# Patient Record
Sex: FEMALE | Race: BLACK OR AFRICAN AMERICAN | Employment: UNEMPLOYED | URBAN - METROPOLITAN AREA
[De-identification: names, ages, dates, MRNs, and addresses within clinical notes are randomized per-mention and may not be internally consistent; named-entity substitution may affect disease eponyms.]

---

## 2020-12-24 ENCOUNTER — OFFICE VISIT (OUTPATIENT)
Dept: ORTHOPEDIC SURGERY | Age: 45
End: 2020-12-24
Payer: MEDICAID

## 2020-12-24 VITALS
WEIGHT: 246 LBS | OXYGEN SATURATION: 98 % | SYSTOLIC BLOOD PRESSURE: 147 MMHG | RESPIRATION RATE: 16 BRPM | DIASTOLIC BLOOD PRESSURE: 81 MMHG | TEMPERATURE: 97.1 F | HEART RATE: 72 BPM

## 2020-12-24 DIAGNOSIS — S83.511A RUPTURE OF ANTERIOR CRUCIATE LIGAMENT OF RIGHT KNEE, INITIAL ENCOUNTER: ICD-10-CM

## 2020-12-24 DIAGNOSIS — S83.241A TEAR OF MEDIAL MENISCUS OF RIGHT KNEE, CURRENT, UNSPECIFIED TEAR TYPE, INITIAL ENCOUNTER: Primary | ICD-10-CM

## 2020-12-24 DIAGNOSIS — M17.11 PRIMARY OSTEOARTHRITIS OF RIGHT KNEE: ICD-10-CM

## 2020-12-24 PROCEDURE — 20610 DRAIN/INJ JOINT/BURSA W/O US: CPT | Performed by: SPECIALIST

## 2020-12-24 PROCEDURE — 99203 OFFICE O/P NEW LOW 30 MIN: CPT | Performed by: SPECIALIST

## 2020-12-24 RX ORDER — AMLODIPINE BESYLATE 5 MG/1
5 TABLET ORAL DAILY
COMMUNITY
End: 2021-10-12 | Stop reason: DRUGHIGH

## 2020-12-24 RX ORDER — BETAMETHASONE SODIUM PHOSPHATE AND BETAMETHASONE ACETATE 3; 3 MG/ML; MG/ML
3 INJECTION, SUSPENSION INTRA-ARTICULAR; INTRALESIONAL; INTRAMUSCULAR; SOFT TISSUE ONCE
Status: COMPLETED | OUTPATIENT
Start: 2020-12-24 | End: 2020-12-24

## 2020-12-24 RX ORDER — CHLORTHALIDONE 25 MG/1
25 TABLET ORAL DAILY
COMMUNITY

## 2020-12-24 RX ADMIN — BETAMETHASONE SODIUM PHOSPHATE AND BETAMETHASONE ACETATE 3 MG: 3; 3 INJECTION, SUSPENSION INTRA-ARTICULAR; INTRALESIONAL; INTRAMUSCULAR; SOFT TISSUE at 09:39

## 2020-12-24 NOTE — PROGRESS NOTES
Patient: Joselito Gunn                MRN: [id number]       SSN: [id number]  YOB: 1975        AGE: 50 y.o. SEX: female    PCP: Staci, Not On File  12/24/20    Chief Complaint   Patient presents with    Knee Pain     right knee pain     HISTORY:  Joselito Gunn is a 50 y.o. female who is seen for right knee pain. She has been experiencing right knee pain for the past 1 year. She noted a sudden pain in January while mopping her kitchen floor. She twisted & felt a popping sensation in her knee when she made a sudden turn. She was able to continue mopping but felt increased swelling and pain the following day. She feels pain with standing, walking and stair climbing. She experiences startup pain after sitting. She is in pain management with Dr. Yareli Vega at [facility]. Pain Assessment  12/24/2020   Location of Pain Knee   Location Modifiers Right   Severity of Pain 6   Quality of Pain Throbbing   Frequency of Pain Intermittent   Aggravating Factors Walking;Standing;Stairs   Limiting Behavior No   Relieving Factors NSAID   Result of Injury No     Occupation, etc:  Ms. Alba Rincon is employed as a  at [employer]. She lives in [place] with her three children (one set of twins, ages 23 and 24). She does not have any grandchildren or any pets. Ms. Alba Rincon weighs 246 lbs. No results found for: HBA1C, HGBE8, TOI5KUIZ, STM4IBAM, IME8PFLX  Weight Metrics 12/24/2020   Weight 246 lb       There is no problem list on file for this patient.     REVIEW OF SYSTEMS:    Constitutional Symptoms: Negative   Eyes: Negative   Ears, Nose, Throat and Mouth: Negative   Cardiovascular: Negative   Respiratory: Negative   Genitourinary: Per HPI   Gastrointestinal: Per HPI   Integumentary (Skin and/or Breast): Negative   Musculoskeletal: Per HPI   Endocrine/Rheumatologic: Negative   Neurological: Per HPI   Hematology/Lymphatic: Negative    Allergic/Immunologic: Negative   Phychiatric: Negative    Social History     Socioeconomic History    Marital status: SINGLE     Spouse name: Not on file    Number of children: Not on file    Years of education: Not on file    Highest education level: Not on file   Occupational History    Not on file   Social Needs    Financial resource strain: Not on file    Food insecurity     Worry: Not on file     Inability: Not on file    Transportation needs     Medical: Not on file     Non-medical: Not on file   Tobacco Use    Smoking status: Not on file   Substance and Sexual Activity    Alcohol use: Not on file    Drug use: Not on file    Sexual activity: Not on file   Lifestyle    Physical activity     Days per week: Not on file     Minutes per session: Not on file    Stress: Not on file   Relationships    Social connections     Talks on phone: Not on file     Gets together: Not on file     Attends Voodoo service: Not on file     Active member of club or organization: Not on file     Attends meetings of clubs or organizations: Not on file     Relationship status: Not on file    Intimate partner violence     Fear of current or ex partner: Not on file     Emotionally abused: Not on file     Physically abused: Not on file     Forced sexual activity: Not on file   Other Topics Concern    Not on file   Social History Narrative    Not on file      No Known Allergies   Current Outpatient Medications   Medication Sig    amLODIPine (NORVASC) 5 mg tablet Take 5 mg by mouth daily.  chlorthalidone (HYGROTON) 25 mg tablet Take 25 mg by mouth daily.      Current Facility-Administered Medications   Medication Dose Route Frequency    betamethasone (CELESTONE) injection 3 mg  3 mg Intra artICUlar ONCE      PHYSICAL EXAMINATION:  Visit Vitals  BP (!) 147/81 (BP 1 Location: Left arm, BP Patient Position: Sitting)   Pulse 72   Temp 97.1 °F (36.2 °C) (Temporal)   Resp 16   Wt 246 lb (111.6 kg)   SpO2 98%      ORTHO EXAMINATION:  Examination Right knee Left knee Skin Intact Intact   Range of motion 100-5 120-0   Effusion - -   Medial joint line tenderness + -   Lateral joint line tenderness - -   Popliteal tenderness - -   Osteophytes palpable + -   Sherrys - -   Patella crepitus + +   Anterior drawer - -   Lateral laxity - -   Medial laxity - -   Varus deformity - -   Valgus deformity - -   Pretibial edema - -   Calf tenderness - -       TIME OUT:  Chart reviewed for the following:   I, Jazmine Bailey MD, have reviewed the History, Physical and updated the Allergic reactions for Evian D Gael   TIME OUT performed immediately prior to start of procedure:  Otilio Ventura MD, have performed the following reviews on Evian D Gael prior to the start of the procedure:          * Patient was identified by name and date of birth   * Agreement on procedure being performed was verified  * Risks and Benefits explained to the patient  * Procedure site verified and marked as necessary  * Patient was positioned for comfort  * Consent was obtained     Time: 9:22 AM     Date of procedure: 12/24/2020  Procedure performed by:  Jazmine Bailey MD  Ms. Liz Min tolerated the procedure well with no complications. MRI RIGHT KNEE 4/29/2020 [facility]  IMPRESSION:   1. Age-indeterminate low-grade partial ACL tear suspected near distal insertion site, likely with a chronic/remote component characterized by intraosseous ganglion cyst formation at the distal insertion site. 2. Medial meniscus posterior horn to body segment degeneration and/or tearing as described. Other major ligaments and lateral meniscus intact. 3. Focal full-thickness cartilage defect in medial trochlear groove. Grade 2-3 cartilage thinning in the medial compartment. RADIOGRAPHS:  XR RIGHT KNEE 12/15/2020 [facility]  IMPRESSION: No acute abnormality.  Degenerative arthropathy, greatest in the medial compartment.  -I have independently reviewed these images during this office visit. - Berna  IMPRESSION:  Three views with bilateral knees on AP view - No fractures, no effusion, moderate joint space narrowing, + osteophytes present. Kellgren Gautam grade 2      IMPRESSION:      ICD-10-CM ICD-9-CM    1. Tear of medial meniscus of right knee, current, unspecified tear type, initial encounter  S83.241A 836.0 REFERRAL TO PHYSICAL THERAPY   2. Primary osteoarthritis of right knee  M17.11 715.16 REFERRAL TO PHYSICAL THERAPY      PROCEDURE AUTHORIZATION TO       betamethasone (CELESTONE) injection 3 mg      FL DRAIN/INJECT LARGE JOINT/BURSA   3. Partial ACL tear  S83.511A 844.2 REFERRAL TO PHYSICAL THERAPY     PLAN: She will start a brief course of outpatient physical therapy. After discussing treatment options, patient's right knee was injected with 4 cc Marcaine and 1/2 cc Celestone. We discussed possible need for right knee arthroplasty at some time in the future if pain continues. She will follow up as needed.       Scribed by Annie Mondragon MD [address]) as dictated by Annie Mondragon MD

## 2021-10-12 ENCOUNTER — OFFICE VISIT (OUTPATIENT)
Dept: CARDIOLOGY CLINIC | Age: 46
End: 2021-10-12
Payer: MEDICAID

## 2021-10-12 VITALS
RESPIRATION RATE: 18 BRPM | WEIGHT: 248 LBS | DIASTOLIC BLOOD PRESSURE: 80 MMHG | OXYGEN SATURATION: 100 % | TEMPERATURE: 98.1 F | SYSTOLIC BLOOD PRESSURE: 141 MMHG | HEART RATE: 71 BPM

## 2021-10-12 DIAGNOSIS — R00.2 PALPITATIONS: Primary | ICD-10-CM

## 2021-10-12 PROCEDURE — 99204 OFFICE O/P NEW MOD 45 MIN: CPT | Performed by: INTERNAL MEDICINE

## 2021-10-12 RX ORDER — AMLODIPINE BESYLATE 10 MG/1
10 TABLET ORAL DAILY
Qty: 90 TABLET | Refills: 3 | Status: SHIPPED | OUTPATIENT
Start: 2021-10-12

## 2021-10-12 RX ORDER — ATORVASTATIN CALCIUM 40 MG/1
TABLET, FILM COATED ORAL
COMMUNITY
Start: 2021-08-26

## 2021-10-12 RX ORDER — CETIRIZINE HCL 10 MG
10 TABLET ORAL DAILY
COMMUNITY
Start: 2021-09-21

## 2021-10-12 RX ORDER — LOSARTAN POTASSIUM 50 MG/1
50 TABLET ORAL DAILY
COMMUNITY
End: 2022-02-15

## 2021-10-12 RX ORDER — GUAIFENESIN 100 MG/5ML
162 LIQUID (ML) ORAL DAILY
COMMUNITY
Start: 2021-07-24

## 2021-10-12 RX ORDER — DICLOFENAC SODIUM 10 MG/G
GEL TOPICAL 4 TIMES DAILY
COMMUNITY
End: 2021-11-03

## 2021-10-12 NOTE — PROGRESS NOTES
Ventura Horvath presents today for   Chief Complaint   Patient presents with    New Patient     nelson Horvath preferred language for health care discussion is english/other. Personal Protective Equipment:   Personal Protective Equipment was used including: mask-surgical and hands-gloves. Patient was placed on no precaution(s). Patient was masked. Precautions:   Patient currently on None  Patient currently roomed with door closed    Is someone accompanying this pt? no    Is the patient using any DME equipment during OV? no    Depression Screening:  3 most recent PHQ Screens 10/12/2021   PHQ Not Done Patient refuses   Little interest or pleasure in doing things -   Feeling down, depressed, irritable, or hopeless -   Total Score PHQ 2 -       Learning Assessment:  No flowsheet data found. Abuse Screening:  No flowsheet data found. Fall Risk  No flowsheet data found. Pt currently taking Anticoagulant therapy? no    Coordination of Care:  1. Have you been to the ER, urgent care clinic since your last visit? Hospitalized since your last visit? yes  2. Have you seen or consulted any other health care providers outside of the [address] [place] [address] since your last visit? Include any pap smears or colon screening.  no

## 2021-10-12 NOTE — PROGRESS NOTES
[facility]    Ms. Irene Mora is 40-year-old female with history of hypertension, hyperlipidemia, obesity and TIA. Patient is here today for cardiac evaluation. She denies any prior history of MI or CHF. She is not entirely sure the reason she is here. She tells me that she had a TIA recently. She denies any chest pain or chest tightness  She denies any significant shortness of breath. She is trying to workout regularly and eat healthy food  She takes her medication regularly without any side effects  She occasionally has some palpitation however lasting only for short period of time without any associated symptoms. She denies presyncope or syncope  Denies any nausea, vomiting, abdominal pain, fever, chills, sputum production. No hematuria or other bleeding complaints    Past Medical History:   Diagnosis Date    HLD (hyperlipidemia)     HTN (hypertension)     Obesity     TIA (transient ischemic attack)        Review of Systems:  Cardiac symptoms as noted above in HPI. All others negative. Current Outpatient Medications   Medication Sig    aspirin 81 mg chewable tablet Take 162 mg by mouth daily.  atorvastatin (LIPITOR) 40 mg tablet TAKE 1 TABLET BY MOUTH DAILY AT BEDTIME    chlorthalidone (HYGROTON) 25 mg tablet Take 25 mg by mouth daily.  cetirizine (ZYRTEC) 10 mg tablet Take 10 mg by mouth daily.  diclofenac (VOLTAREN) 1 % gel Apply  to affected area four (4) times daily. No current facility-administered medications for this visit. No past surgical history on file. Allergies and Sensitivities:  No Known Allergies    Family History:  No family history on file. Social History:  Social History     Tobacco Use    Smoking status: Unknown If Ever Smoked   Substance Use Topics    Alcohol use: Not Currently    Drug use: Never     She  has no history on file for tobacco use.   She  reports previous alcohol use.    Physical Exam:  BP Readings from Last 3 Encounters:   10/12/21 (!) 153/74   12/24/20 (!) 147/81         Pulse Readings from Last 3 Encounters:   10/12/21 75   12/24/20 72          Wt Readings from Last 3 Encounters:   10/12/21 112.5 kg (248 lb)   12/24/20 111.6 kg (246 lb)       Constitutional: Oriented to person, place, and time. HENT: Head: Normocephalic and atraumatic. Eyes: Conjunctivae and extraocular motions are normal.   Neck: No JVD present. Carotid bruit is not appreciated. Cardiovascular: Regular rhythm. No murmur, gallop or rubs appreciated  Lung: Breath sounds normal. No respiratory distress. No ronchi or rales appreciated  Abdominal: No tenderness. No rebound and no guarding. Musculoskeletal: There is no lower extremity edema. No cynosis  Lymphadenopathy:  No cervical or supraclavicular adenopathy appriciated. Neurological: No gross motor deficit noted. Skin: No visible skin rash noted. No Ear discharge noted  Psychiatric: Normal mood and affect. LABS:   @  Lab Results   Component Value Date/Time    WBC 10.7 01/29/2010 05:58 PM    HGB 9.7 (L) 01/29/2010 05:58 PM    HCT 31.8 (L) 01/29/2010 05:58 PM    PLATELET 507 31/38/6691 05:58 PM    MCV 70.7 (L) 01/29/2010 05:58 PM     Lab Results   Component Value Date/Time    Sodium 139 01/29/2010 05:58 PM    Potassium 3.8 01/29/2010 05:58 PM    Chloride 100 01/29/2010 05:58 PM    CO2 30 01/29/2010 05:58 PM    Glucose 82 01/29/2010 05:58 PM    BUN 7 01/29/2010 05:58 PM    Creatinine 0.9 01/29/2010 05:58 PM     No flowsheet data found.   No results found for: ALT  No results found for: HBA1C, PCN3PQBT, SIN3OINR, FTT6VBFQ  No results found for: TSH, TSH2, TSH3, TSHP, TSHEXT, TSHEXT    ECHO (09/2021)  * No evidence of shunt at atrial level by 2D, color flow and negative bubble study.     * Normal left ventricular cavity size with mild septal and moderate posterior hypertrophy.     * Left ventricular systolic function is hyperdynamic with mid to distal cavity obliteration and an ejection fraction of 80 % by visual estimation.     * Left ventricular diastolic function: normal.     * Normal right ventricular cavity size. Mildly reduced systolic function with preserved annular excursion.     * Right atrial chamber volume is mildly enlarged.     * Mild mitral and tricuspid regurgitation.     * No pulmonary hypertension, estimated pulmonary arterial systolic pressure is 16 mmHg. STRESS TEST (EST, PHARM, NUC, ECHO etc)    CATHETERIZATION    IMPRESSION & PLAN:  Ms. Jaki Esposito is a 49-year-old female    Hypertension:  /74. Will increase amlodipine to 10 mg daily. Continue other medication  BP check again in 2 weeks    Hyperlipidemia:  On atorvastatin. This is being checked by PCP regularly. Defer to PCP    TIA:  Had a TIA/strokelike symptoms in 07/2021. Occasional palpitation. Will place event monitor to rule out any arrhythmia or A. Fib  On aspirin, statin  Defer to PCP    Importance of diet and exercise was discussed with patient. This plan was discussed with patient who is in agreement. Thank you for allowing me to participate in patient care. Please feel free to call me if you have any question or concern. Coco Velasquez MD  Please note: This document has been produced using voice recognition software. Unrecognized errors in transcription may be present.

## 2021-10-12 NOTE — PATIENT INSTRUCTIONS
3 week BP check     New Medication/Medication Changes  Increase Norvasc to 10 mg daily   **please allow 24-48 hrs for medication to be escribed to pharmacy** If you need any refills on medications please contact your pharmacy so that the request can be escribed to the provider for review. Other Testing  [employer]( 30 day monitor) -will be calling you to confirm your address to send your Heart Monitor. Please allow 7-10 business days for monitor to arrive at your home.   Customer Service for [facility] [address]:  [phone]

## 2021-11-01 ENCOUNTER — TELEPHONE (OUTPATIENT)
Dept: CARDIOLOGY CLINIC | Age: 46
End: 2021-11-01

## 2021-11-01 ENCOUNTER — CLINICAL SUPPORT (OUTPATIENT)
Dept: CARDIOLOGY CLINIC | Age: 46
End: 2021-11-01

## 2021-11-01 VITALS — SYSTOLIC BLOOD PRESSURE: 146 MMHG | DIASTOLIC BLOOD PRESSURE: 75 MMHG | HEART RATE: 76 BPM

## 2021-11-01 DIAGNOSIS — Z01.30 BLOOD PRESSURE CHECK: Primary | ICD-10-CM

## 2021-11-01 NOTE — Clinical Note
Amlodipine, 10 mg, hygroton 25 mg and losartan 25 mg- please advise on any medication changes due to elevated BP check

## 2021-11-01 NOTE — TELEPHONE ENCOUNTER
Attempted to contact pt at  number, no answer. Lvm on secure line to increase losartan to 50 mg daily. Advised p to contact office at  [phone] to schedule 2 week bp check. Will continue to try to contact pt.

## 2021-11-01 NOTE — PROGRESS NOTES
Gina Horn was seen in our office today for BP evaluation. Listed below are the patients current meds:      Current Outpatient Medications:     aspirin 81 mg chewable tablet, Take 162 mg by mouth daily. , Disp: , Rfl:     atorvastatin (LIPITOR) 40 mg tablet, TAKE 1 TABLET BY MOUTH DAILY AT BEDTIME, Disp: , Rfl:     cetirizine (ZYRTEC) 10 mg tablet, Take 10 mg by mouth daily. , Disp: , Rfl:     diclofenac (VOLTAREN) 1 % gel, Apply  to affected area four (4) times daily. , Disp: , Rfl:     amLODIPine (NORVASC) 10 mg tablet, Take 1 Tablet by mouth daily. , Disp: 90 Tablet, Rfl: 3    losartan (COZAAR) 25 mg tablet, Take 25 mg by mouth daily. , Disp: , Rfl:     chlorthalidone (HYGROTON) 25 mg tablet, Take 25 mg by mouth daily. , Disp: , Rfl:       No current facility-administered medications for this visit. Visit Vitals  BP (!) 146/75   Pulse 76         Per pt she has been taking meds as rx. She stated she had not eating anything fatty or salty today and stated she drinks water. Pt denied any restlessness. Will forward to provider for review.

## 2021-11-02 NOTE — TELEPHONE ENCOUNTER
Contacted pt at [facility] number. Two patient Identifiers confirmed. Advised pt per Dr Nohemy Fay. Pt verbalized understanding and scheduled 2 weeks Bp check.

## 2021-11-03 ENCOUNTER — OFFICE VISIT (OUTPATIENT)
Dept: NEUROLOGY | Age: 46
End: 2021-11-03
Payer: MEDICAID

## 2021-11-03 VITALS
SYSTOLIC BLOOD PRESSURE: 130 MMHG | OXYGEN SATURATION: 96 % | DIASTOLIC BLOOD PRESSURE: 90 MMHG | HEART RATE: 81 BPM | WEIGHT: 243.8 LBS | RESPIRATION RATE: 16 BRPM

## 2021-11-03 DIAGNOSIS — R06.83 SNORING: ICD-10-CM

## 2021-11-03 DIAGNOSIS — I10 HYPERTENSION, UNSPECIFIED TYPE: ICD-10-CM

## 2021-11-03 DIAGNOSIS — R73.03 PREDIABETES: ICD-10-CM

## 2021-11-03 DIAGNOSIS — R41.3 MEMORY LOSS: ICD-10-CM

## 2021-11-03 DIAGNOSIS — G45.9 TIA (TRANSIENT ISCHEMIC ATTACK): Primary | ICD-10-CM

## 2021-11-03 DIAGNOSIS — R40.0 DAYTIME SLEEPINESS: ICD-10-CM

## 2021-11-03 PROCEDURE — 99204 OFFICE O/P NEW MOD 45 MIN: CPT | Performed by: NURSE PRACTITIONER

## 2021-11-03 RX ORDER — DICLOFENAC SODIUM 75 MG/1
TABLET, DELAYED RELEASE ORAL
COMMUNITY
Start: 2021-10-28

## 2021-11-03 NOTE — PROGRESS NOTES
S Resources  [address] [facility], [address], [place], [address]  [facility] Natanael Ozuna, [address]  Office:  [phone]  Fax: [phone]    Referring: Furman Schlatter, MD      Chief Complaint   Patient presents with    Follow-up    TIA       HPI:  Ching Coulter presents in new patient evaluation for TIA. She was admitted to [facility] in July 2021 for TIA. The records were reviewed in [facility]. She has a medical history of COPD and hypertension. Per records, it was noted that she initially woke up with the acute onset of left upper extremity weakness that lasted about an hour and spontaneously resolved. She denied associated numbness or tingling. It did not seem to be related to position per report. She also had complaint of chest pressure and pain associated with shortness of breath. She denied any significant increase in personal stressors that could cause anxiety. She was admitted for evaluation of TIA versus CVA and possible ACS. MRI of the brain on 7/23/2021 reported \"no acute brain abnormalities. Tiny chronic bilateral cerebellar strokes. No acute stroke. Few tiny cerebral white matter lesions some of which could be motion artifact. Nonspecific but likely a few chronic microvascular ischemic foci. Prominent adenoids. Nonspecific but the most common causes are chronic infections and allergies. \"  Carotid PVL reported normal extracranial carotid and vertebral duplex examination. TTE reported no evidence of shunt at atrial level by 2D, color flow and negative bubble study. Normal left ventricular cavity size with mild septal and moderate posterior hypertrophy. Left ventricular systolic function hyperdynamic and EF 80% by visual estimation. Hemoglobin A1c was 6.1. Lipid panel showed total cholesterol 135 and LDL-C 75. She had hypokalemia with potassium of 3.2 then 3.0.   She was discharged on aspirin 162 mg daily and atorvastatin 40 mg daily. She was continued on her home antihypertensive medication. It was noted that she had EKG with sinus rhythm and cardiac enzymes were negative. She worked with PT and OT and it was indicated that she did not need ongoing therapies. She did not need speech therapy. The discharge summary indicates that the chest pain may be anxiety related to waking up with left upper extremity weakness with concern for stroke. It was noted that chest x-ray showed vascular congestion and pulmonary edema and she uses albuterol as needed for COPD. She was discharged home at that time. She presents today and is accompanied by her son. She reports when she presented to the hospital, she had acute onset of LUE weakness the night before. She thought it was normal.  Reports a doctor told her she had arthritis in her shoulders. It was an orthopedic doctor. She reports the right shoulder is worse, the left acts up too but not as bad. Reports she presented with SOB and CP. Also the left arm was weak. She could not raise it up at all and had a weaker . No arm pain. She is right-handed. She never had stroke-like symptoms before. She denied associated speech change. She had visual change of seeing stars. Reports her BP was up. Endorses headache. She gets them at times. Denies numbness/tingling of the arm, it was just weak. She has history of hypertension and hyperlipidemia. Denies history of DM. She reports she was taking aspirin 81 mg before this. She had ran out before this occurred, about 3 weeks she believes. Her doctor at [facility] had ordered it. She saw Dr. Katia Ely cardiologist recently. Amlodipine was increased. She continues on atorvastatin which is 40 mg. She reported palpitations. She has an event monitor on currently. They do not occur every day, maybe once a week. She has concerns with her memory. She forgets little things. Misplaces items. Forgets appointments.   Goes into a room and forgets why she went there. She endorses sleeping at night on and off. Wakes up in the night. This is due to shoulder pain. Reports it is hard to get BP down when she is in pain. She is on oral diclofenac. BP is 130/90 in office today. Denies LUE weakness anymore but it gets 'tired' with use. Denies recurrence of weakness of LUE. Her memory has been affected for a while. Endorses loud snoring at times. Denies witnessed apneic spells. Endorses daytime fatigue. She wonders about taking turmeric. Endorses her thyroid was checked before covid. She had a normal TSH in 2017 found in [facility]. Denies mood disorders like depression or anxiety. Social history:  She works in [email or web address] at [employer].  Denies smoking, drug use, or alcohol use. She lives with her 1kids- 21year old and a set of 25year old twins. Family history: Mom had hypertension and renal disease. Does not know father's history. Brother has diabetes and hypertension. Aunt had diabetes. Grandmother had cardiac disease. Social History     Socioeconomic History    Marital status: SINGLE     Spouse name: Not on file    Number of children: Not on file    Years of education: Not on file    Highest education level: Not on file   Occupational History    Not on file   Tobacco Use    Smoking status: Unknown If Ever Smoked    Smokeless tobacco: Never Used   Substance and Sexual Activity    Alcohol use: Not Currently    Drug use: Never    Sexual activity: Not on file   Other Topics Concern    Not on file   Social History Narrative    Not on file     Social Determinants of Health     Financial Resource Strain:     Difficulty of Paying Living Expenses: Not on file   Food Insecurity:     Worried About Running Out of Food in the Last Year: Not on file    Dolly of Food in the Last Year: Not on file   Transportation Needs:     Lack of Transportation (Medical):  Not on file    Lack of Transportation (Non-Medical): Not on file   Physical Activity:     Days of Exercise per Week: Not on file    Minutes of Exercise per Session: Not on file   Stress:     Feeling of Stress : Not on file   Social Connections:     Frequency of Communication with Friends and Family: Not on file    Frequency of Social Gatherings with Friends and Family: Not on file    Attends Worship Services: Not on file    Active Member of [address] [place] [address] or Organizations: Not on file    Attends Club or Organization Meetings: Not on file    Marital Status: Not on file   Intimate Partner Violence:     Fear of Current or Ex-Partner: Not on file    Emotionally Abused: Not on file    Physically Abused: Not on file    Sexually Abused: Not on file   Housing Stability:     Unable to Pay for Housing in the Last Year: Not on file    Number of [place] in the Last Year: Not on file    Unstable Housing in the Last Year: Not on file       History reviewed. No pertinent family history. Current Outpatient Medications   Medication Sig Dispense Refill    aspirin 81 mg chewable tablet Take 162 mg by mouth daily.  atorvastatin (LIPITOR) 40 mg tablet TAKE 1 TABLET BY MOUTH DAILY AT BEDTIME      cetirizine (ZYRTEC) 10 mg tablet Take 10 mg by mouth daily.  amLODIPine (NORVASC) 10 mg tablet Take 1 Tablet by mouth daily. 90 Tablet 3    losartan (COZAAR) 50 mg tablet Take 50 mg by mouth daily.  chlorthalidone (HYGROTON) 25 mg tablet Take 25 mg by mouth daily.  diclofenac EC (VOLTAREN) 75 mg EC tablet TAKE 1 TABLET BY MOUTH TWICE DAILY WITH FOOD ON A SCHEDULED BASIS FOR 14 DAYS THEN AS NEEDED         Past Medical History:   Diagnosis Date    HLD (hyperlipidemia)     HTN (hypertension)     Obesity     TIA (transient ischemic attack)        No past surgical history on file. No Known Allergies    There is no problem list on file for this patient. Review of Systems:   Constitutional: no fever or chills. +fatigue.    Skin denies rash or itching  HEENT:  Denies tinnitus, hearing loss, or visual changes. +reports recent earache for 2 days (right side). Respiratory: denies shortness of breath  Cardiovascular: denies chest pain, dyspnea on exertion. +palpitations. Gastrointestinal: +nausea started this weekend. Denies vomiting. (denies chance of pregnancy). Genitourinary: does not report dysuria or incontinence  Musculoskeletal: +bilateral shoulder pain. +knee pain. Endocrine: denies weight change  Hematology:no easy bleeding. Endorses easy bruising. Neurological: as above in HPI      PHYSICAL EXAMINATION:      VITAL SIGNS:    Visit Vitals  BP (!) 130/90   Pulse 81   Resp 16   Wt 110.6 kg (243 lb 12.8 oz)   SpO2 96%       GENERAL: Well developed, well nourished, in no apparent distress. HEART: RR, no murmurs heard, no carotid bruits. LUNGS:                      CTAB. EXTREMITIES: No clubbing, cyanosis, or edema is identified. Pulses 2+    and symmetrical.  HEAD:   Normocephalic, atraumatic. NEUROLOGIC EXAMINATION    MENTAL STATUS: Awake, alert, and oriented x 4. Attention and STM are grossly normal. There is no aphasia. Fund of knowledge is adequate. Mood and affect are appropriate. Takes some time to respond- tired? CRANIAL NERVES: Visual fields are full to confrontation. Pupils are reactive to light and accommodation. Extraocular movements are intact and there is no nystagmus. Facial sensation is normal.  Face is symmetrical.   Hearing is grossly intact. SCM/TPZ 5/5. Palate rises symmetrically. Tongue is in the midline. MOTOR:  Normal tone, bulk, and strength, 5/5 muscle strength throughout. No cogwheel rigidity present. CEREBELLAR: Finger to nose was normal.   No tremors or dysmetria. SENSORY: Normal LT and PP. DTRs: +2 throughout. GAIT:   Gait is antalgic secondary to knee pain, a bit wide-based.           CBC:   Lab Results   Component Value Date/Time    WBC 10.7 01/29/2010 05:58 PM RBC 4.49 01/29/2010 05:58 PM    HGB 9.7 (L) 01/29/2010 05:58 PM    HCT 31.8 (L) 01/29/2010 05:58 PM    PLATELET 845 35/46/2068 05:58 PM     BMP:   Lab Results   Component Value Date/Time    Glucose 82 01/29/2010 05:58 PM    Sodium 139 01/29/2010 05:58 PM    Potassium 3.8 01/29/2010 05:58 PM    Chloride 100 01/29/2010 05:58 PM    CO2 30 01/29/2010 05:58 PM    BUN 7 01/29/2010 05:58 PM    Creatinine 0.9 01/29/2010 05:58 PM    Calcium 8.6 01/29/2010 05:58 PM     CMP:   Lab Results   Component Value Date/Time    Glucose 82 01/29/2010 05:58 PM    Sodium 139 01/29/2010 05:58 PM    Potassium 3.8 01/29/2010 05:58 PM    Chloride 100 01/29/2010 05:58 PM    CO2 30 01/29/2010 05:58 PM    BUN 7 01/29/2010 05:58 PM    Creatinine 0.9 01/29/2010 05:58 PM    Calcium 8.6 01/29/2010 05:58 PM    Anion gap 9 01/29/2010 05:58 PM    BUN/Creatinine ratio 8 (L) 01/29/2010 05:58 PM     Coagulation: No results found for: PTP, INR, APTT, PTTT, INREXT  Cardiac markers: No results found for: CPK, CKND1, ALEYDA  ABGs: No results found for: PH, PO2, PCO2, HCO3, BD, BE  Radiology review:     MRI BRAIN WO CONT 7/23/2021 in Care Everywhere:   Impression    1. No acute brain abnormalities. 2. Tiny chronic bilateral cerebellar strokes. No acute stroke. 3. Few tiny cerebral white matter lesions some of which could be motion artifact. Nonspecific but likely a few chronic microvascular ischemic foci. 4. Prominent adenoids. Nonspecific but the most common causes are chronic infections and allergies. Signed By: Mike Minor MD on 7/23/2021 4:57 PM    Narrative    EXAM: MR BRAIN WITHOUT CONTRAST     CLINICAL INDICATION/HISTORY: Focal Neuro deficit, new, fixed or worsening, stroke suspected. Headache and dizziness.      COMPARISON: CT 7/22/2021     TECHNIQUE: Multiplanar multi-sequential imaging of the whole brain without contrast. Sequences include sagittal and axial T1W, axial T2W, axial T2W FLAIR, DWI, a susceptibility weighted and coronal T1W gradient echo sequence. FINDINGS:     Brain: Some sequences are motion degraded. There are a few T2/FLAIR hyperintense cerebral white matter lesions some of which could be artifact. 3 tiny remote bilateral cerebellar strokes. No acute stroke. No mass effect or mass lesion. No microbleed. Brain volumes and ventricular spaces are normal for age. Vascular system: Grossly patent flow in the major vessels. Sella: Normal for non-dedicated exam.     Skull base: Normal.     Calvarium : Normal.     Paranasal sinuses and mastoid air cells: Single opacified right ethmoid air cell with no associated expansion. Visualized orbits: Normal for non-dedicated exam.     Visualized upper cervical spine: Mild degenerative change. Prominent adenoids. Impression/Plan: This is a 38-year-old female who presents in new patient evaluation after experiencing a TIA in July 2021. She presented at that time with transient left upper extremity weakness. MRI of the brain reported no acute stroke but she had report of tiny chronic bilateral cerebellar strokes. She has risk factors including hypertension and prediabetes. She also presented chest pain at that time and reports palpitations. She is currently seeing cardiology and wearing a cardiac event monitor. She is taking aspirin 162 mg and atorvastatin 40 mg daily. She has adhering to the medications. Discussed her personal vascular risk factors including prediabetes and to follow with her primary care provider and cardiologist for monitoring her risk factors including hypertension. Will obtain an MRA of the head and neck to evaluate for any areas of stenosis. She also reports concern about her memory. Reports not sleeping well and is up during the night due to shoulder pain. May consider orthopedic evaluation for her joint pains including shoulder pain and knee pain. Will obtain a TSH and vitamin B12 level.   Will obtain sleep evaluation due to her report of poor sleep, fatigue, and snoring. Will evaluate for potential sleep apnea as this can be a cardiovascular risk factor and associated with her memory concerns and headaches. Will also obtain neuropsych evaluation with Dr. Leila Green due to her memory concerns. Follow up after MRA and neuropsych evaluation, can call with MRA results sooner. Diagnoses and all orders for this visit:    1. TIA (transient ischemic attack)  -     MRA BRAIN WO CONT; Future  -     MRA NECK W WO CONT; Future    2. Hypertension, unspecified type    3. Prediabetes    4. Memory loss  -     TSH AND FREE T4; Future  -     VITAMIN B12 & FOLATE; Future  -     REFERRAL TO NEUROPSYCHOLOGY    5. Snoring  -     REFERRAL TO NEUROLOGY    6. Daytime sleepiness  -     REFERRAL TO NEUROLOGY        I spent 55 minutes with the patient in face-to-face consultation, with 30 minutes spent in counseling and coordination of care as described above. Signed By: Leighton Baldwin NP              PLEASE NOTE:   Portions of this document may have been produced using voice recognition software. Unrecognized errors in transcription may be present.

## 2021-11-03 NOTE — PROGRESS NOTES
Curtis Greer is a 50 y.o. female who is in the office as a follow up. 1. Have you been to the ER, urgent care clinic since your last visit? Hospitalized since your last visit? No    2. Have you seen or consulted any other health care providers outside of the [address] [place] [address] since your last visit? Include any pap smears or colon screening.  No

## 2021-11-03 NOTE — PATIENT INSTRUCTIONS

## 2021-11-04 DIAGNOSIS — G45.9 TIA (TRANSIENT ISCHEMIC ATTACK): ICD-10-CM

## 2021-11-24 ENCOUNTER — HOSPITAL ENCOUNTER (OUTPATIENT)
Dept: MRI IMAGING | Age: 46
Discharge: HOME | End: 2021-11-24
Attending: NURSE PRACTITIONER
Payer: MEDICAID

## 2021-11-24 PROCEDURE — A9576 INJ PROHANCE MULTIPACK: HCPCS | Performed by: NURSE PRACTITIONER

## 2021-11-24 PROCEDURE — 70544 MR ANGIOGRAPHY HEAD W/O DYE: CPT

## 2021-11-24 PROCEDURE — 74011250636 HC RX REV CODE- 250/636: Performed by: NURSE PRACTITIONER

## 2021-11-24 PROCEDURE — 70549 MR ANGIOGRAPH NECK W/O&W/DYE: CPT

## 2021-11-24 RX ADMIN — GADOTERIDOL 20 ML: 279.3 INJECTION, SOLUTION INTRAVENOUS at 18:24

## 2021-11-30 ENCOUNTER — TELEPHONE (OUTPATIENT)
Dept: NEUROLOGY | Age: 46
End: 2021-11-30

## 2021-11-30 NOTE — TELEPHONE ENCOUNTER
Called patient to give MRA results but it went to ; left message on voicemail to call back the office.  MRA head and neck were normal.

## 2021-12-21 DIAGNOSIS — R00.2 PALPITATIONS: ICD-10-CM

## 2022-02-15 ENCOUNTER — OFFICE VISIT (OUTPATIENT)
Dept: CARDIOLOGY CLINIC | Age: 47
End: 2022-02-15
Payer: MEDICAID

## 2022-02-15 VITALS
HEART RATE: 74 BPM | DIASTOLIC BLOOD PRESSURE: 75 MMHG | SYSTOLIC BLOOD PRESSURE: 146 MMHG | OXYGEN SATURATION: 98 % | WEIGHT: 244 LBS | RESPIRATION RATE: 16 BRPM | TEMPERATURE: 98.3 F

## 2022-02-15 DIAGNOSIS — R00.2 PALPITATIONS: ICD-10-CM

## 2022-02-15 DIAGNOSIS — E78.00 PURE HYPERCHOLESTEROLEMIA: ICD-10-CM

## 2022-02-15 DIAGNOSIS — I10 ESSENTIAL HYPERTENSION WITH GOAL BLOOD PRESSURE LESS THAN 140/90: Primary | ICD-10-CM

## 2022-02-15 PROCEDURE — 99214 OFFICE O/P EST MOD 30 MIN: CPT | Performed by: INTERNAL MEDICINE

## 2022-02-15 RX ORDER — LOSARTAN POTASSIUM 50 MG/1
75 TABLET ORAL DAILY
Qty: 60 TABLET | Refills: 3 | Status: SHIPPED | OUTPATIENT
Start: 2022-02-15

## 2022-02-15 NOTE — PROGRESS NOTES
Identified pt with two pt identifiers(name and ). Reviewed record in preparation for visit and have obtained necessary documentation. Celso Knapp presents today for   Chief Complaint   Patient presents with    Follow-up     4m               Celso Knapp preferred language for health care discussion is english/other. Personal Protective Equipment:   Personal Protective Equipment was used including: mask-surgical and hands-gloves. Patient was placed on no precaution(s). Patient was masked. Precautions:   Patient currently on None  Patient currently roomed with door closed. Is someone accompanying this pt? Is the patient using any DME equipment during OV? Depression Screening:  3 most recent PHQ Screens 2/15/2022   PHQ Not Done -   Little interest or pleasure in doing things Not at all   Feeling down, depressed, irritable, or hopeless Not at all   Total Score PHQ 2 0       Learning Assessment:  No flowsheet data found. Abuse Screening:  No flowsheet data found. Fall Risk  No flowsheet data found. Pt currently taking Anticoagulant therapy? no  Pt currently taking Antiplatelet therapy? no    Coordination of Care:  1. Have you been to the ER, urgent care clinic since your last visit? Hospitalized since your last visit? no    2. Have you seen or consulted any other health care providers outside of the [address] [place] [address] since your last visit? Include any pap smears or colon screening. no      Please see Red banners under Allergies and Med Rec to remove outside inquires. All correct information has been verified with patient and added to chart.      Medication's patient's would liked removed has been marked not taking to be removed per Verbal order and read back per Mynor Damian MD

## 2022-02-15 NOTE — PROGRESS NOTES
[facility]    Ms. Rimma Haile is 48 y.o. female with history of hypertension, hyperlipidemia, obesity and TIA. Patient is here today for cardiac evaluation. She denies any prior history of MI or CHF. She had her echocardiogram and event monitor done since last visit. She denies any chest pain or chest tightness. She has dyspnea on moderate to severe exertion, unchanged. She is telling me that she has some memory issues since she had a TIA. She admits of unhealthy dietary habits as well as sedentary lifestyle. She does not perform any regular exercise    Past Medical History:   Diagnosis Date    HLD (hyperlipidemia)     HTN (hypertension)     Obesity     TIA (transient ischemic attack)        Review of Systems:  Cardiac symptoms as noted above in HPI. All others negative. Current Outpatient Medications   Medication Sig    aspirin 81 mg chewable tablet Take 162 mg by mouth daily.  atorvastatin (LIPITOR) 40 mg tablet TAKE 1 TABLET BY MOUTH DAILY AT BEDTIME    amLODIPine (NORVASC) 10 mg tablet Take 1 Tablet by mouth daily.  losartan (COZAAR) 50 mg tablet Take 50 mg by mouth daily.  chlorthalidone (HYGROTON) 25 mg tablet Take 25 mg by mouth daily.  diclofenac EC (VOLTAREN) 75 mg EC tablet TAKE 1 TABLET BY MOUTH TWICE DAILY WITH FOOD ON A SCHEDULED BASIS FOR 14 DAYS THEN AS NEEDED    cetirizine (ZYRTEC) 10 mg tablet Take 10 mg by mouth daily. No current facility-administered medications for this visit. No past surgical history on file. Allergies and Sensitivities:  Allergies   Allergen Reactions    Beta-Blockers (Beta-Adrenergic Blocking Agts) Shortness of Breath    Iodinated Contrast Media Swelling    Losartan Cough       Family History:  No family history on file.     Social History:  Social History     Tobacco Use    Smoking status: Unknown If Ever Smoked    Smokeless tobacco: Never Used   Substance Use Topics    Alcohol use: Not Currently    Drug use: Never     She  has an unknown smoking status. She has never used smokeless tobacco.  She  reports previous alcohol use. Physical Exam:  BP Readings from Last 3 Encounters:   02/15/22 (!) 147/70   11/03/21 (!) 130/90   11/01/21 (!) 146/75         Pulse Readings from Last 3 Encounters:   02/15/22 79   11/03/21 81   11/01/21 76          Wt Readings from Last 3 Encounters:   02/15/22 110.7 kg (244 lb)   11/03/21 110.6 kg (243 lb 12.8 oz)   10/12/21 112.5 kg (248 lb)       Constitutional: Oriented to person, place, and time. HENT: Head: Normocephalic and atraumatic. Neck: No JVD present. Carotid bruit is not appreciated. Cardiovascular: Regular rhythm. No murmur, gallop or rubs appreciated  Lung: Breath sounds normal. No respiratory distress. No ronchi or rales appreciated  Abdominal: No tenderness. No rebound and no guarding. Musculoskeletal: There is no lower extremity edema. No cynosis    LABS:   @  Lab Results   Component Value Date/Time    WBC 10.7 01/29/2010 05:58 PM    HGB 9.7 (L) 01/29/2010 05:58 PM    HCT 31.8 (L) 01/29/2010 05:58 PM    PLATELET 286 77/90/3724 05:58 PM    MCV 70.7 (L) 01/29/2010 05:58 PM     Lab Results   Component Value Date/Time    Sodium 139 01/29/2010 05:58 PM    Potassium 3.8 01/29/2010 05:58 PM    Chloride 100 01/29/2010 05:58 PM    CO2 30 01/29/2010 05:58 PM    Glucose 82 01/29/2010 05:58 PM    BUN 7 01/29/2010 05:58 PM    Creatinine 0.9 01/29/2010 05:58 PM     No flowsheet data found.   No results found for: ALT  No results found for: HBA1C, WIP9THVO, RCR6TCWS, KPT6HIZY  No results found for: TSH, TSH2, TSH3, TSHP, TSHEXT, TSHEXT    ECHO (09/2021)  * No evidence of shunt at atrial level by 2D, color flow and negative bubble study.     * Normal left ventricular cavity size with mild septal and moderate posterior hypertrophy.     * Left ventricular systolic function is hyperdynamic with mid to distal cavity obliteration and an ejection fraction of 80 % by visual estimation.     * Left ventricular diastolic function: normal.     * Normal right ventricular cavity size. Mildly reduced systolic function with preserved annular excursion.     * Right atrial chamber volume is mildly enlarged.     * Mild mitral and tricuspid regurgitation.     * No pulmonary hypertension, estimated pulmonary arterial systolic pressure is 16 mmHg. STRESS TEST (EST, PHARM, NUC, ECHO etc)    IMPRESSION & PLAN:  Ms. Petar Torres is a 48 y.o. female    Hypertension:  /70. BP at home usually 140s. And will increase losartan from 50 mg to 75 mg daily. Continue amlodipine 10 and chlorthalidone. She will continue to check blood pressure at home. Goal blood pressure less than 088 systolic discussed with the patient    Hyperlipidemia:  On atorvastatin. This is being checked by PCP regularly. Defer to PCP    TIA:  Had a TIA/strokelike symptoms in 07/2021. Event monitor in 10/21, Sinus rhythm. No a.fib. PVC burden 1%, No sustained arrythmia. On aspirin, statin  Defer to PCP    Importance of diet and exercise was discussed with patient. This plan was discussed with patient who is in agreement. Thank you for allowing me to participate in patient care. Please feel free to call me if you have any question or concern. Madonna Collins MD  Please note: This document has been produced using voice recognition software. Unrecognized errors in transcription may be present.

## 2022-02-15 NOTE — PATIENT INSTRUCTIONS
New Medication/Medication Changes  Increase losartan to 75 mg daily     **please allow 24-48 hrs for medication to be escribed to pharmacy** If you need any refills on medications please contact your pharmacy so that the request can be escribed to the provider for review.

## 2022-02-15 NOTE — LETTER
2/15/2022    Patient: Jonas Lincoln   YOB: 1975   Date of Visit: 2/15/2022     Roger Marquez MD  [address] [place] [address] [address] [id number]  Via Fax: [phone]    Dear Roger Marquez MD,      Thank you for referring Ms. Azalia Mckeon to [facility] AT [facility] for evaluation. My notes for this consultation are attached. If you have questions, please do not hesitate to call me. I look forward to following your patient along with you.       Sincerely,    Anthony Corbin MD

## 2022-08-18 ENCOUNTER — OFFICE VISIT (OUTPATIENT)
Dept: CARDIOLOGY CLINIC | Age: 47
End: 2022-08-18
Payer: MEDICAID

## 2022-08-18 VITALS
SYSTOLIC BLOOD PRESSURE: 122 MMHG | TEMPERATURE: 97 F | DIASTOLIC BLOOD PRESSURE: 70 MMHG | OXYGEN SATURATION: 100 % | HEART RATE: 65 BPM | WEIGHT: 247.2 LBS

## 2022-08-18 DIAGNOSIS — R00.2 PALPITATIONS: Primary | ICD-10-CM

## 2022-08-18 PROCEDURE — 99214 OFFICE O/P EST MOD 30 MIN: CPT | Performed by: INTERNAL MEDICINE

## 2022-08-18 PROCEDURE — 93000 ELECTROCARDIOGRAM COMPLETE: CPT | Performed by: INTERNAL MEDICINE

## 2022-08-18 RX ORDER — POTASSIUM CHLORIDE 750 MG/1
1 TABLET, FILM COATED, EXTENDED RELEASE ORAL DAILY
COMMUNITY
Start: 2022-07-01

## 2022-08-18 NOTE — PROGRESS NOTES
Identified pt with two pt identifiers(name and ). Reviewed record in preparation for visit and have obtained necessary documentation. Talia Beaver presents today for   Chief Complaint   Patient presents with    Follow-up       Pt c/o marge Mayo preferred language for health care discussion is english/other. Personal Protective Equipment:   Personal Protective Equipment was used including: mask-surgical and hands-gloves. Patient was placed on no precaution(s). Patient was masked. Precautions:   Patient currently on None  Patient currently roomed with door closed. Is someone accompanying this pt? no    Is the patient using any DME equipment during [address]? no    Depression Screening:  3 most recent PHQ Screens 2022   PHQ Not Done -   Little interest or pleasure in doing things Not at all   Feeling down, depressed, irritable, or hopeless Not at all   Total Score PHQ 2 0       Learning Assessment:  No flowsheet data found. Abuse Screening:  No flowsheet data found. Fall Risk  No flowsheet data found. Pt currently taking Anticoagulant therapy? No   Pt currently taking Antiplatelet therapy? yes    Coordination of Care:  1. Have you been to the ER, urgent care clinic since your last visit? Hospital[facility] since your last visit? Yes[facility]    2. Have you seen or consulted any other health care providers outside of the [address] [place] [address] since your last visit? Include any pap smears or colon screening. yes      Please see Red banners under Allergies and Med Rec to remove outside inquires. All correct information has been verified with patient and added to chart.      Medication's patient's would liked removed has been marked not taking to be removed per Verbal order and read back per Lashell Sandoval MD

## 2022-08-18 NOTE — PROGRESS NOTES
[facility]    Ms. Chavez Buck is 48 y.o. female with history of hypertension, hyperlipidemia, obesity and TIA. Patient is here today for follow-up appointment for hypertension and hyperlipidemia. She denies any prior history of MI or CHF. She denies any chest pain or chest tightness. She is worried about her memory loss since her stroke. She has appointment with neurology team in couple months. She denies any significant lower extremity swelling. No presyncope or syncope. She has occasional vertigo. Past Medical History:   Diagnosis Date    HLD (hyperlipidemia)     HTN (hypertension)     Obesity     TIA (transient ischemic attack)        Review of Systems:  Cardiac symptoms as noted above in HPI. All others negative. Current Outpatient Medications   Medication Sig    potassium chloride SR (KLOR-CON 10) 10 mEq tablet Take 1 Tablet by mouth daily. losartan (COZAAR) 50 mg tablet Take 1.5 Tablets by mouth daily. aspirin 81 mg chewable tablet Take 162 mg by mouth daily. atorvastatin (LIPITOR) 40 mg tablet TAKE 1 TABLET BY MOUTH DAILY AT BEDTIME    amLODIPine (NORVASC) 10 mg tablet Take 1 Tablet by mouth daily. chlorthalidone (HYGROTON) 25 mg tablet Take 25 mg by mouth daily. diclofenac EC (VOLTAREN) 75 mg EC tablet TAKE 1 TABLET BY MOUTH TWICE DAILY WITH FOOD ON A SCHEDULED BASIS FOR 14 DAYS THEN AS NEEDED    cetirizine (ZYRTEC) 10 mg tablet Take 10 mg by mouth daily. No current facility-administered medications for this visit. No past surgical history on file. Allergies and Sensitivities:  Allergies   Allergen Reactions    Beta-Blockers (Beta-Adrenergic Blocking Agts) Shortness of Breath    Iodinated Contrast Media Swelling    Losartan Cough       Family History:  No family history on file.     Social History:  Social History     Tobacco Use    Smoking status: Unknown    Smokeless tobacco: Never   Substance Use Topics    Alcohol use: Not Currently    Drug use: Never     She  reports that she has an unknown smoking status. She has never used smokeless tobacco.  She  reports that she does not currently use alcohol. Physical Exam:  BP Readings from Last 3 Encounters:   08/18/22 122/70   02/15/22 (!) 146/75   11/03/21 (!) 130/90         Pulse Readings from Last 3 Encounters:   08/18/22 65   02/15/22 74   11/03/21 81          Wt Readings from Last 3 Encounters:   08/18/22 112.1 kg (247 lb 3.2 oz)   02/15/22 110.7 kg (244 lb)   11/03/21 110.6 kg (243 lb 12.8 oz)       Constitutional: Oriented to person, place, and time. HENT: Head: Normocephalic and atraumatic. Neck: No JVD present. Carotid bruit is not appreciated. Cardiovascular: Regular rhythm. No murmur, gallop or rubs appreciated  Lung: Breath sounds normal. No respiratory distress. No ronchi or rales appreciated  Abdominal: No tenderness. No rebound and no guarding. Musculoskeletal: There is no lower extremity edema. No cynosis    LABS:   @  Lab Results   Component Value Date/Time    WBC 10.7 01/29/2010 05:58 PM    HGB 9.7 (L) 01/29/2010 05:58 PM    HCT 31.8 (L) 01/29/2010 05:58 PM    PLATELET 547 18/00/4697 05:58 PM    MCV 70.7 (L) 01/29/2010 05:58 PM     Lab Results   Component Value Date/Time    Sodium 139 01/29/2010 05:58 PM    Potassium 3.8 01/29/2010 05:58 PM    Chloride 100 01/29/2010 05:58 PM    CO2 30 01/29/2010 05:58 PM    Glucose 82 01/29/2010 05:58 PM    BUN 7 01/29/2010 05:58 PM    Creatinine 0.9 01/29/2010 05:58 PM     No flowsheet data found. No results found for: ALT  No results found for: HBA1C, GYW9PSGO, WRD2PIFL, BTX2XLKU  No results found for: TSH, TSH2, TSH3, TSHP, TSHEXT, TSHEXT    ECHO (09/2021)  * No evidence of shunt at atrial level by 2D, color flow and negative bubble study. * Normal left ventricular cavity size with mild septal and moderate posterior hypertrophy.      * Left ventricular systolic function is hyperdynamic with mid to distal cavity obliteration and an ejection fraction of 80 % by visual estimation. * Left ventricular diastolic function: normal.     * Normal right ventricular cavity size. Mildly reduced systolic function with preserved annular excursion. * Right atrial chamber volume is mildly enlarged. * Mild mitral and tricuspid regurgitation. * No pulmonary hypertension, estimated pulmonary arterial systolic pressure is 16 mmHg. IMPRESSION & PLAN:  Ms. Torsten Alan is a 48 y.o. female    Hypertension: /70. Continue losartan, amlodipine, chlorthalidone. Hyperlipidemia: On atorvastatin. This is being checked by PCP regularly. Defer to PCP    TIA: Had a TIA/strokelike symptoms in 07/2021. Event monitor in 10/21, Sinus rhythm. No a.fib. PVC burden 1%, No sustained arrythmia. On aspirin, statin. She has follow appointment coming up with neurology team in 2 months    Currently patient denies any symptom that is concerning for angina or heart failure    Importance of diet and exercise was discussed with patient. This plan was discussed with patient who is in agreement. Thank you for allowing me to participate in patient care. Please feel free to call me if you have any question or concern. Fang Lott MD  Please note: This document has been produced using voice recognition software. Unrecognized errors in transcription may be present.

## 2024-03-28 RX ORDER — LOSARTAN POTASSIUM 50 MG/1
75 TABLET ORAL DAILY
Qty: 90 TABLET | Refills: 3 | Status: SHIPPED | OUTPATIENT
Start: 2024-03-28

## 2024-03-28 NOTE — TELEPHONE ENCOUNTER
PCP: Thi You MD    Last appt:  Visit date not found   Future Appointments   Date Time Provider Department Center   4/18/2024 11:00 AM Varghese Sun MD [facility]       Requested Prescriptions      No prescriptions requested or ordered in this encounter       Request for a 30 or 90 day supply? Provider Discretion    Pharmacy: Confirmed     Other Comments: N/A

## 2024-04-18 ENCOUNTER — OFFICE VISIT (OUTPATIENT)
Age: 49
End: 2024-04-18
Payer: MEDICAID

## 2024-04-18 VITALS
HEIGHT: 63 IN | DIASTOLIC BLOOD PRESSURE: 83 MMHG | HEART RATE: 64 BPM | BODY MASS INDEX: 43.52 KG/M2 | OXYGEN SATURATION: 98 % | WEIGHT: 245.6 LBS | SYSTOLIC BLOOD PRESSURE: 164 MMHG

## 2024-04-18 DIAGNOSIS — R00.2 PALPITATION: Primary | ICD-10-CM

## 2024-04-18 PROCEDURE — 99214 OFFICE O/P EST MOD 30 MIN: CPT | Performed by: INTERNAL MEDICINE

## 2024-04-18 PROCEDURE — 93000 ELECTROCARDIOGRAM COMPLETE: CPT | Performed by: INTERNAL MEDICINE

## 2024-04-18 RX ORDER — HYDROXYZINE HYDROCHLORIDE 25 MG/1
TABLET, FILM COATED ORAL
COMMUNITY
End: 2024-04-18

## 2024-04-18 RX ORDER — ALBUTEROL SULFATE 90 UG/1
AEROSOL, METERED RESPIRATORY (INHALATION)
COMMUNITY

## 2024-04-18 RX ORDER — LOSARTAN POTASSIUM 100 MG/1
100 TABLET ORAL DAILY
Qty: 90 TABLET | Refills: 3 | Status: SHIPPED | OUTPATIENT
Start: 2024-04-18

## 2024-04-18 ASSESSMENT — PATIENT HEALTH QUESTIONNAIRE - PHQ9
SUM OF ALL RESPONSES TO PHQ QUESTIONS 1-9: 0
2. FEELING DOWN, DEPRESSED OR HOPELESS: NOT AT ALL
SUM OF ALL RESPONSES TO PHQ QUESTIONS 1-9: 0
SUM OF ALL RESPONSES TO PHQ9 QUESTIONS 1 & 2: 0
1. LITTLE INTEREST OR PLEASURE IN DOING THINGS: NOT AT ALL

## 2024-05-20 ENCOUNTER — NURSE ONLY (OUTPATIENT)
Age: 49
End: 2024-05-20

## 2024-05-20 VITALS — SYSTOLIC BLOOD PRESSURE: 123 MMHG | DIASTOLIC BLOOD PRESSURE: 60 MMHG | HEART RATE: 64 BPM

## 2024-05-20 DIAGNOSIS — Z01.30 BLOOD PRESSURE CHECK: Primary | ICD-10-CM

## 2024-05-20 NOTE — PROGRESS NOTES
Thelma Vides was seen in our office today for BP evaluation. Listed below are the patients current meds:      Current Outpatient Medications:     albuterol sulfate HFA (PROVENTIL;VENTOLIN;PROAIR) 108 (90 Base) MCG/ACT inhaler, Albuterol HFA Inhaler 90 mcg/actuation    AS DIRECTED    active, Disp: , Rfl:     losartan (COZAAR) 100 MG tablet, Take 1 tablet by mouth daily, Disp: 90 tablet, Rfl: 3    amLODIPine (NORVASC) 10 MG tablet, Take 1 tablet by mouth daily, Disp: , Rfl:     aspirin 81 MG chewable tablet, Take 2 tablets by mouth daily, Disp: , Rfl:     atorvastatin (LIPITOR) 40 MG tablet, TAKE 1 TABLET BY MOUTH DAILY AT BEDTIME, Disp: , Rfl:     cetirizine (ZYRTEC) 10 MG tablet, Take 10 mg by mouth daily, Disp: , Rfl:     diclofenac (VOLTAREN) 75 MG EC tablet, TAKE 1 TABLET BY MOUTH TWICE DAILY WITH FOOD ON A SCHEDULED BASIS FOR 14 DAYS THEN AS NEEDED, Disp: , Rfl:     potassium chloride (KLOR-CON) 10 MEQ extended release tablet, Take 1 tablet by mouth daily, Disp: , Rfl:       No current facility-administered medications for this visit.    Vitals:    05/20/24 1045 05/20/24 1052   BP: (!) 142/72 123/60   Site: Left Upper Arm Left Upper Arm   Position: Sitting Sitting   Cuff Size: Large Adult Large Adult   Pulse: 71 64        Plan:     Patient to continue current medications. Advised patient that I would forward to Dr. Sun for review and further instructions. Advised patient that we would call within 24-48 hours with any changes. Patient verbalized understanding.

## 2024-08-06 ENCOUNTER — OFFICE VISIT (OUTPATIENT)
Facility: CLINIC | Age: 49
End: 2024-08-06
Payer: COMMERCIAL

## 2024-08-06 VITALS
OXYGEN SATURATION: 96 % | DIASTOLIC BLOOD PRESSURE: 68 MMHG | BODY MASS INDEX: 37.92 KG/M2 | HEIGHT: 63 IN | RESPIRATION RATE: 16 BRPM | WEIGHT: 214 LBS | HEART RATE: 66 BPM | TEMPERATURE: 97 F | SYSTOLIC BLOOD PRESSURE: 134 MMHG

## 2024-08-06 DIAGNOSIS — Z13.0 SCREENING FOR IRON DEFICIENCY ANEMIA: ICD-10-CM

## 2024-08-06 DIAGNOSIS — Z13.1 SCREENING FOR DIABETES MELLITUS (DM): ICD-10-CM

## 2024-08-06 DIAGNOSIS — Z11.59 ENCOUNTER FOR HEPATITIS C SCREENING TEST FOR LOW RISK PATIENT: ICD-10-CM

## 2024-08-06 DIAGNOSIS — Z00.00 ANNUAL PHYSICAL EXAM: Primary | ICD-10-CM

## 2024-08-06 DIAGNOSIS — Z13.29 SCREENING FOR THYROID DISORDER: ICD-10-CM

## 2024-08-06 DIAGNOSIS — Z76.89 ESTABLISHING CARE WITH NEW DOCTOR, ENCOUNTER FOR: ICD-10-CM

## 2024-08-06 DIAGNOSIS — J45.20 MILD INTERMITTENT ASTHMA WITHOUT COMPLICATION: ICD-10-CM

## 2024-08-06 DIAGNOSIS — E78.5 HYPERLIPIDEMIA, UNSPECIFIED HYPERLIPIDEMIA TYPE: ICD-10-CM

## 2024-08-06 DIAGNOSIS — Z11.4 SCREENING FOR HIV (HUMAN IMMUNODEFICIENCY VIRUS): ICD-10-CM

## 2024-08-06 DIAGNOSIS — R42 VERTIGO: ICD-10-CM

## 2024-08-06 DIAGNOSIS — Z12.11 SCREENING FOR COLON CANCER: ICD-10-CM

## 2024-08-06 DIAGNOSIS — I10 ESSENTIAL HYPERTENSION: ICD-10-CM

## 2024-08-06 PROCEDURE — 3078F DIAST BP <80 MM HG: CPT | Performed by: INTERNAL MEDICINE

## 2024-08-06 PROCEDURE — 99386 PREV VISIT NEW AGE 40-64: CPT | Performed by: INTERNAL MEDICINE

## 2024-08-06 PROCEDURE — 3075F SYST BP GE 130 - 139MM HG: CPT | Performed by: INTERNAL MEDICINE

## 2024-08-06 PROCEDURE — 99204 OFFICE O/P NEW MOD 45 MIN: CPT | Performed by: INTERNAL MEDICINE

## 2024-08-06 RX ORDER — MECLIZINE HCL 12.5 MG/1
12.5 TABLET ORAL DAILY PRN
Qty: 7 TABLET | Refills: 0 | Status: SHIPPED | OUTPATIENT
Start: 2024-08-06 | End: 2024-08-13

## 2024-08-06 RX ORDER — LOSARTAN POTASSIUM 100 MG/1
100 TABLET ORAL DAILY
Qty: 90 TABLET | Refills: 1 | Status: SHIPPED | OUTPATIENT
Start: 2024-08-06

## 2024-08-06 RX ORDER — ASPIRIN 81 MG/1
81 TABLET ORAL DAILY
COMMUNITY

## 2024-08-06 RX ORDER — ALBUTEROL SULFATE 90 UG/1
2 AEROSOL, METERED RESPIRATORY (INHALATION) EVERY 6 HOURS PRN
Qty: 18 G | Refills: 1 | Status: SHIPPED | OUTPATIENT
Start: 2024-08-06

## 2024-08-06 RX ORDER — DICLOFENAC SODIUM 75 MG/1
75 TABLET, DELAYED RELEASE ORAL 2 TIMES DAILY
COMMUNITY
End: 2024-08-06

## 2024-08-06 RX ORDER — AMLODIPINE BESYLATE 10 MG/1
10 TABLET ORAL DAILY
COMMUNITY
End: 2024-08-06 | Stop reason: SDUPTHER

## 2024-08-06 RX ORDER — POTASSIUM CHLORIDE 750 MG/1
10 TABLET, FILM COATED, EXTENDED RELEASE ORAL DAILY
COMMUNITY

## 2024-08-06 RX ORDER — AMLODIPINE BESYLATE 10 MG/1
10 TABLET ORAL DAILY
Qty: 90 TABLET | Refills: 1 | Status: SHIPPED | OUTPATIENT
Start: 2024-08-06

## 2024-08-06 RX ORDER — ATORVASTATIN CALCIUM 40 MG/1
40 TABLET, FILM COATED ORAL DAILY
Qty: 90 TABLET | Refills: 1 | Status: SHIPPED | OUTPATIENT
Start: 2024-08-06

## 2024-08-06 RX ORDER — LOSARTAN POTASSIUM 100 MG/1
100 TABLET ORAL DAILY
COMMUNITY
End: 2024-08-06 | Stop reason: SDUPTHER

## 2024-08-06 RX ORDER — ATORVASTATIN CALCIUM 40 MG/1
40 TABLET, FILM COATED ORAL DAILY
COMMUNITY
End: 2024-08-06 | Stop reason: SDUPTHER

## 2024-08-06 RX ORDER — CETIRIZINE HYDROCHLORIDE 10 MG/1
10 TABLET ORAL DAILY
COMMUNITY

## 2024-08-06 SDOH — ECONOMIC STABILITY: FOOD INSECURITY: WITHIN THE PAST 12 MONTHS, THE FOOD YOU BOUGHT JUST DIDN'T LAST AND YOU DIDN'T HAVE MONEY TO GET MORE.: OFTEN TRUE

## 2024-08-06 SDOH — ECONOMIC STABILITY: HOUSING INSECURITY
IN THE LAST 12 MONTHS, WAS THERE A TIME WHEN YOU DID NOT HAVE A STEADY PLACE TO SLEEP OR SLEPT IN A SHELTER (INCLUDING NOW)?: NO

## 2024-08-06 SDOH — ECONOMIC STABILITY: INCOME INSECURITY: HOW HARD IS IT FOR YOU TO PAY FOR THE VERY BASICS LIKE FOOD, HOUSING, MEDICAL CARE, AND HEATING?: NOT HARD AT ALL

## 2024-08-06 SDOH — ECONOMIC STABILITY: FOOD INSECURITY: WITHIN THE PAST 12 MONTHS, YOU WORRIED THAT YOUR FOOD WOULD RUN OUT BEFORE YOU GOT MONEY TO BUY MORE.: OFTEN TRUE

## 2024-08-06 ASSESSMENT — ENCOUNTER SYMPTOMS
SHORTNESS OF BREATH: 0
DIARRHEA: 0
CONSTIPATION: 0
ABDOMINAL PAIN: 0
COUGH: 0

## 2024-08-06 ASSESSMENT — PATIENT HEALTH QUESTIONNAIRE - PHQ9
SUM OF ALL RESPONSES TO PHQ QUESTIONS 1-9: 0
2. FEELING DOWN, DEPRESSED OR HOPELESS: NOT AT ALL
SUM OF ALL RESPONSES TO PHQ QUESTIONS 1-9: 0
1. LITTLE INTEREST OR PLEASURE IN DOING THINGS: NOT AT ALL
SUM OF ALL RESPONSES TO PHQ QUESTIONS 1-9: 0
SUM OF ALL RESPONSES TO PHQ QUESTIONS 1-9: 0
SUM OF ALL RESPONSES TO PHQ9 QUESTIONS 1 & 2: 0

## 2024-08-06 NOTE — PATIENT INSTRUCTIONS
[facility] Food Resources*  (Call United Way/211 if need more resources.)      [facility] and the [facility]  What they offer: Assistance with finding a food pantry, soup kitchen or resource near you.  Website: https://Novintline.org/get-help/community-resources/  Provide instructions for assistance with SNAP ([place]) application on this site.     SNAP ([place])  What they offer: SNAP is used like cash to buy eligible food items from authorized retailers.  Apply for benefits online: https://www.ZEturfp.[place].gov/  Apply for benefits by telephone: 282.798.3544  Apply for benefits at local Department of      Meals on Wheels   What they offer: Meals on Wheels is a program that delivers meals to individuals age 60+ at home who are unable to purchase or prepare their own meals.   Website: https://[employer].org/how-we-help/meals-on-wheels/  Requirements can vary by program and areas served.   To apply:  Contact [facility]: [phone] (Mon -Fri 8:30 a.m. to 4:30 p.m.)  Coverage Area:  [facility], [facility] & [facility]  Website: [email or web address]/contact-[email or web address]  Contact [place] [employer] [phone] (Mon - Fri 8:00am to 5:30pm)  Coverage Areas: [facility], [facility].    [facility]  What they offer:  [facility] provides comprehensive services to the disadvantaged/low-income community, homebound seniors and homeless in [place], [facility], and [facility].  Phone Number: [phone]  [address] [place] [address]  Services:  Food pantry (Monday, Wednesday, Friday), Soup kitchen, Senior Grocery Delivery Program, Food Bank, hygiene essentials, aid with completing SNAP applications.   Website:

## 2024-08-06 NOTE — PROGRESS NOTES
Subjective:      Patient ID: Thelma Vides is a 48 y.o. female.    Annual physical    Patient comes to establish PCP.  Patient denies fever chills sweats chest pain shortness of breath.  No GI symptoms, no abdominal pain, no blood in stools.  He had an abnormal finding on mammogram performed at [facility] according to the patient.  According to patient they found cysts and they needed to follow-up with but she has not had more information about it.  We requested records from mammogram today.  Patient also has an appointment follow-up with them.  She has experiencing some hot flashes off and on for the last couple of months.  History of hysterectomy 2015 from fibroids.  Next appointment will check cold flashes and hormonal workup.  Blood pressure is currently controlled, can continue current treatment.  Statins are tolerated can continue atorvastatin along with aspirin for hyperlipidemia and TIA.  Neurological exam unremarkable.  No gross or evident neurological deficit.  Apparently patient was taking low-dose potassium tablets daily, will check electrolytes and replace potassium as needed.  Asthma symptoms are controlled, asymptomatic, no wheezing or shortness of breath.  She usually uses albuterol inhaler when is triggered by exercises.  Declined vaccination.          Breast cyst 07/2024  Abnormal breast mammogram showing cysts according to jennifer, She sees spe[facility]ialsit in [facility].  We requested records from mammogram.  FU in [facility].    Hx of hysterectomy 2/2 fibroids and heavy bleeding and cramps (2015)    Mild intermittent asthma  albuterol sulfate inhaler, as needed.    Essential hypertension  losartan (COZAAR) 100 MG tablet, daily.  amLODIPine (NORVASC) 10 MG tablet, daily.    Hyperlipidemia  atorvastatin (LIPITOR) 40 MG tablet, daily.    Allergic rhinitis  cetirizine (ZYRTEC) 10 MG tablet, daily as needed.    TIA 2023  Neurological exam unremarkable.  No gross or evident neurological deficit.  aspirin 81 MG EC

## 2024-08-07 LAB
A/G RATIO: 1.5 RATIO (ref 1.1–2.6)
ALBUMIN: 4.1 G/DL (ref 3.5–5)
ALP BLD-CCNC: 67 U/L (ref 25–115)
ALT SERPL-CCNC: 25 U/L (ref 5–40)
ANION GAP SERPL CALCULATED.3IONS-SCNC: 10 MMOL/L (ref 3–15)
AST SERPL-CCNC: 22 U/L (ref 10–37)
BASOPHILS ABSOLUTE: 0 K/UL (ref 0–0.2)
BASOPHILS RELATIVE PERCENT: 0 % (ref 0–2)
BILIRUB SERPL-MCNC: 0.7 MG/DL (ref 0.2–1.2)
BUN BLDV-MCNC: 10 MG/DL (ref 6–22)
CALCIUM SERPL-MCNC: 9.1 MG/DL (ref 8.4–10.5)
CHLORIDE BLD-SCNC: 101 MMOL/L (ref 98–110)
CHOLESTEROL, TOTAL: 97 MG/DL (ref 110–200)
CHOLESTEROL/HDL RATIO: 2.2 (ref 0–5)
CO2: 33 MMOL/L (ref 20–32)
CREAT SERPL-MCNC: 0.5 MG/DL (ref 0.5–1.2)
EOSINOPHIL # BLD: 4 % (ref 0–6)
EOSINOPHILS ABSOLUTE: 0.3 K/UL (ref 0–0.5)
ESTIMATED AVERAGE GLUCOSE: 118 MG/DL (ref 91–123)
GFR, ESTIMATED: >60 ML/MIN/1.73 SQ.M.
GLOBULIN: 2.8 G/DL (ref 2–4)
GLUCOSE: 90 MG/DL (ref 70–99)
HBA1C MFR BLD: 5.7 % (ref 4.8–5.6)
HCT VFR BLD CALC: 45.3 % (ref 35.1–48)
HDLC SERPL-MCNC: 45 MG/DL
HEMOGLOBIN: 13.7 G/DL (ref 11.7–16)
HEPATITIS C ANTIBODY: NORMAL
HIV -1/0/2 AG/AB WITH REFLEX: NON REACTIVE
HIV INTERPRETATION: NORMAL
LDL CHOLESTEROL: 39 MG/DL (ref 50–99)
LDL/HDL RATIO: 0.9
LYMPHOCYTES # BLD: 21 % (ref 20–45)
LYMPHOCYTES ABSOLUTE: 1.6 K/UL (ref 1–4.8)
MCH RBC QN AUTO: 27 PG (ref 26–34)
MCHC RBC AUTO-ENTMCNC: 30 G/DL (ref 31–36)
MCV RBC AUTO: 90 FL (ref 80–99)
MONOCYTES ABSOLUTE: 0.5 K/UL (ref 0.1–1)
MONOCYTES: 7 % (ref 3–12)
NEUTROPHILS ABSOLUTE: 5.1 K/UL (ref 1.8–7.7)
NEUTROPHILS: 67 % (ref 40–75)
NON-HDL CHOLESTEROL: 52 MG/DL
PDW BLD-RTO: 16.7 % (ref 10–15.5)
PLATELET # BLD: 360 K/UL (ref 140–440)
PMV BLD AUTO: 9.4 FL (ref 9–13)
POTASSIUM SERPL-SCNC: 3.5 MMOL/L (ref 3.5–5.5)
RBC # BLD: 5.03 M/UL (ref 3.8–5.2)
SODIUM BLD-SCNC: 144 MMOL/L (ref 133–145)
T4 FREE: 1.2 NG/DL (ref 0.9–1.8)
TOTAL PROTEIN: 6.9 G/DL (ref 6.4–8.3)
TRIGL SERPL-MCNC: 62 MG/DL (ref 40–149)
TSH SERPL DL<=0.05 MIU/L-ACNC: 0.66 MCU/ML (ref 0.27–4.2)
VLDLC SERPL CALC-MCNC: 12 MG/DL (ref 8–30)
WBC # BLD: 7.6 K/UL (ref 4–11)

## 2024-08-19 ENCOUNTER — CLINICAL DOCUMENTATION (OUTPATIENT)
Facility: CLINIC | Age: 49
End: 2024-08-19

## 2024-08-19 RX ORDER — POTASSIUM CHLORIDE 750 MG/1
TABLET, FILM COATED, EXTENDED RELEASE ORAL
Qty: 20 TABLET | Refills: 0 | Status: SHIPPED | OUTPATIENT
Start: 2024-08-19

## 2024-08-19 NOTE — PROGRESS NOTES
Received request to refill Potassium tablets, according to last visit, she was not taking potassium tablet. I called patient multiple time without answer. I asked staff to call patient for clarification. In the meanwhile a did a short refill.

## 2024-08-19 NOTE — TELEPHONE ENCOUNTER
Last Appointment:  8/6/2024  Future Appointments   Date Time Provider Department Center   9/10/2024  9:30 AM Amrita Hughes MD [facility] DEP   9/17/2024 11:30 AM Dung Ramirez MD [facility] DEP   10/24/2024 11:30 AM Kate Loja PA-C [facility] AMB

## 2024-10-03 ENCOUNTER — HOSPITAL ENCOUNTER (OUTPATIENT)
Facility: HOSPITAL | Age: 49
Setting detail: SPECIMEN
Discharge: HOME | End: 2024-10-06

## 2024-10-03 ENCOUNTER — OFFICE VISIT (OUTPATIENT)
Facility: CLINIC | Age: 49
End: 2024-10-03
Payer: COMMERCIAL

## 2024-10-03 VITALS
HEART RATE: 68 BPM | BODY MASS INDEX: 43.94 KG/M2 | HEIGHT: 63 IN | TEMPERATURE: 97 F | WEIGHT: 248 LBS | SYSTOLIC BLOOD PRESSURE: 136 MMHG | OXYGEN SATURATION: 94 % | RESPIRATION RATE: 16 BRPM | DIASTOLIC BLOOD PRESSURE: 74 MMHG

## 2024-10-03 DIAGNOSIS — R23.2 HOT FLASHES: Primary | ICD-10-CM

## 2024-10-03 DIAGNOSIS — E87.6 HYPOKALEMIA: ICD-10-CM

## 2024-10-03 LAB — SENTARA SPECIMEN COLLECTION: NORMAL

## 2024-10-03 PROCEDURE — 99001 SPECIMEN HANDLING PT-LAB: CPT

## 2024-10-03 PROCEDURE — 99214 OFFICE O/P EST MOD 30 MIN: CPT | Performed by: INTERNAL MEDICINE

## 2024-10-03 RX ORDER — POTASSIUM CHLORIDE 750 MG/1
10 TABLET, EXTENDED RELEASE ORAL DAILY
Qty: 90 TABLET | Refills: 1 | Status: SHIPPED | OUTPATIENT
Start: 2024-10-03

## 2024-10-03 RX ORDER — GABAPENTIN 100 MG/1
CAPSULE ORAL
Qty: 90 CAPSULE | Refills: 0 | Status: SHIPPED | OUTPATIENT
Start: 2024-10-03 | End: 2024-11-01

## 2024-10-03 ASSESSMENT — ENCOUNTER SYMPTOMS: SHORTNESS OF BREATH: 0

## 2024-10-03 NOTE — PROGRESS NOTES
Subjective:      Patient ID: Thelma Vides is a 48 y.o. female.    Follow-up    Patient is coming today to discuss her hot flashes.  She admits feeling seen cold flashes for about a year now.  Mostly present in the evening and nighttime.  He does not let her sleep.  She uses fans and open the windows without much help.  She complains of fatigue, occasional sweating.  She has a history of hysterectomy with bilateral salpingectomy, no records about oophorectomy.  Will get hormonal testing.  Started gabapentin at nighttime.  Patient was instructed not to drive or do activities that can put her or others at risk while taking this medication since it can cause drowsiness and sleepiness in order to avoid accidents.  Patient understood and agreed with the plan.  She is taking potassium tablets daily for a few years now.  Electrolytes within normal limits on potassium tablet daily.  Follow-up in 4 weeks to monitor electrolytes and hot flashes response to treatment.          Breast cyst 07/2024  Abnormal breast mammogram showing cysts according to jennifer, She sees [facility] in [facility].  We requested records from mammogram.  FU in [facility].    Hx of hysterectomy 2/2 fibroids and heavy bleeding and cramps (Hysterectomy 8/13/2018)  Operative Procedure: Total laparoscopic hysterectomy with bilateral salpingectomy and colpopexy, abdominal approach, cystoscopy    Mild intermittent asthma  albuterol sulfate inhaler, as needed.    Essential hypertension  losartan (COZAAR) 100 MG tablet, daily.  amLODIPine (NORVASC) 10 MG tablet, daily.    Hyperlipidemia/TIA  atorvastatin (LIPITOR) 40 MG tablet, daily.    Allergic rhinitis  cetirizine (ZYRTEC) 10 MG tablet, daily as needed.    TIA 2023  Neurological exam unremarkable.  No gross or evident neurological deficit.  aspirin 81 MG EC tablet, daily. OTC.  atorvastatin (LIPITOR) 40 MG tablet, daily.    Occasional vertigo  Once a month usually when bending forward.  Uses meclizine as needed

## 2024-10-04 LAB
FOLLICLE STIMULATING HORMONE: 7.5 MIU/ML
LUTEINIZING HORMONE: 15.4 MIU/ML
PROLACTIN: 14.9 NG/ML (ref 4.8–23.3)

## 2025-01-07 ENCOUNTER — COMMUNITY OUTREACH (OUTPATIENT)
Facility: CLINIC | Age: 50
End: 2025-01-07

## 2025-01-07 NOTE — PROGRESS NOTES
Patient's HM shows they are overdue for Colorectal Screening.   Care Everywhere and  files searched.   updated with 10/21/24 Colonoscopy.

## 2025-01-21 ENCOUNTER — HOSPITAL ENCOUNTER (OUTPATIENT)
Facility: HOSPITAL | Age: 50
Setting detail: SPECIMEN
Discharge: HOME | End: 2025-01-24

## 2025-01-21 ENCOUNTER — OFFICE VISIT (OUTPATIENT)
Facility: CLINIC | Age: 50
End: 2025-01-21
Payer: COMMERCIAL

## 2025-01-21 VITALS
HEIGHT: 62 IN | HEART RATE: 67 BPM | RESPIRATION RATE: 17 BRPM | OXYGEN SATURATION: 97 % | BODY MASS INDEX: 45.86 KG/M2 | SYSTOLIC BLOOD PRESSURE: 135 MMHG | WEIGHT: 249.2 LBS | DIASTOLIC BLOOD PRESSURE: 71 MMHG | TEMPERATURE: 97 F

## 2025-01-21 DIAGNOSIS — R73.03 PRE-DIABETES: ICD-10-CM

## 2025-01-21 DIAGNOSIS — E78.5 HYPERLIPIDEMIA, UNSPECIFIED HYPERLIPIDEMIA TYPE: ICD-10-CM

## 2025-01-21 DIAGNOSIS — G89.29 CHRONIC PAIN OF BOTH KNEES: Primary | ICD-10-CM

## 2025-01-21 DIAGNOSIS — M25.561 CHRONIC PAIN OF BOTH KNEES: Primary | ICD-10-CM

## 2025-01-21 DIAGNOSIS — Z86.39 HISTORY OF LOW POTASSIUM: ICD-10-CM

## 2025-01-21 DIAGNOSIS — M25.562 CHRONIC PAIN OF BOTH KNEES: Primary | ICD-10-CM

## 2025-01-21 DIAGNOSIS — E87.6 HYPOKALEMIA: ICD-10-CM

## 2025-01-21 DIAGNOSIS — I10 ESSENTIAL HYPERTENSION: ICD-10-CM

## 2025-01-21 DIAGNOSIS — R23.2 HOT FLASHES: ICD-10-CM

## 2025-01-21 LAB — SENTARA SPECIMEN COLLECTION: NORMAL

## 2025-01-21 PROCEDURE — 99001 SPECIMEN HANDLING PT-LAB: CPT

## 2025-01-21 PROCEDURE — 3078F DIAST BP <80 MM HG: CPT | Performed by: INTERNAL MEDICINE

## 2025-01-21 PROCEDURE — 3075F SYST BP GE 130 - 139MM HG: CPT | Performed by: INTERNAL MEDICINE

## 2025-01-21 PROCEDURE — 99214 OFFICE O/P EST MOD 30 MIN: CPT | Performed by: INTERNAL MEDICINE

## 2025-01-21 RX ORDER — POTASSIUM CHLORIDE 750 MG/1
10 TABLET, EXTENDED RELEASE ORAL DAILY
Qty: 90 TABLET | Refills: 1 | Status: SHIPPED | OUTPATIENT
Start: 2025-01-21

## 2025-01-21 RX ORDER — GABAPENTIN 300 MG/1
300 CAPSULE ORAL
Qty: 90 CAPSULE | Refills: 1 | Status: SHIPPED | OUTPATIENT
Start: 2025-01-21 | End: 2025-07-20

## 2025-01-21 RX ORDER — DICLOFENAC SODIUM 75 MG/1
75 TABLET, DELAYED RELEASE ORAL 2 TIMES DAILY
Qty: 60 TABLET | Refills: 0 | Status: SHIPPED | OUTPATIENT
Start: 2025-01-21

## 2025-01-21 RX ORDER — LOSARTAN POTASSIUM 100 MG/1
100 TABLET ORAL DAILY
Qty: 90 TABLET | Refills: 1 | Status: SHIPPED | OUTPATIENT
Start: 2025-01-21

## 2025-01-21 RX ORDER — AMLODIPINE BESYLATE 10 MG/1
10 TABLET ORAL DAILY
Qty: 90 TABLET | Refills: 1 | Status: SHIPPED | OUTPATIENT
Start: 2025-01-21

## 2025-01-21 RX ORDER — ATORVASTATIN CALCIUM 40 MG/1
40 TABLET, FILM COATED ORAL DAILY
Qty: 90 TABLET | Refills: 1 | Status: SHIPPED | OUTPATIENT
Start: 2025-01-21

## 2025-01-21 RX ORDER — GABAPENTIN 300 MG/1
300 CAPSULE ORAL
Qty: 90 CAPSULE | Refills: 1 | Status: SHIPPED | OUTPATIENT
Start: 2025-01-21 | End: 2025-01-21 | Stop reason: SDUPTHER

## 2025-01-21 SDOH — ECONOMIC STABILITY: FOOD INSECURITY: WITHIN THE PAST 12 MONTHS, THE FOOD YOU BOUGHT JUST DIDN'T LAST AND YOU DIDN'T HAVE MONEY TO GET MORE.: NEVER TRUE

## 2025-01-21 SDOH — ECONOMIC STABILITY: FOOD INSECURITY: WITHIN THE PAST 12 MONTHS, YOU WORRIED THAT YOUR FOOD WOULD RUN OUT BEFORE YOU GOT MONEY TO BUY MORE.: NEVER TRUE

## 2025-01-21 ASSESSMENT — PATIENT HEALTH QUESTIONNAIRE - PHQ9
SUM OF ALL RESPONSES TO PHQ QUESTIONS 1-9: 2
1. LITTLE INTEREST OR PLEASURE IN DOING THINGS: MORE THAN HALF THE DAYS
SUM OF ALL RESPONSES TO PHQ QUESTIONS 1-9: 2
2. FEELING DOWN, DEPRESSED OR HOPELESS: NOT AT ALL
SUM OF ALL RESPONSES TO PHQ9 QUESTIONS 1 & 2: 2

## 2025-01-21 ASSESSMENT — ENCOUNTER SYMPTOMS: SHORTNESS OF BREATH: 0

## 2025-01-21 NOTE — PROGRESS NOTES
Subjective:      Patient ID: Thelma Vides is a 49 y.o. female.    Follow-up    Patient coming for routine follow-up on chronic conditions.   She would like to increase the dose of gabapentin to 40 mg nightly as needed hot flashes.  Her blood pressure well-controlled.  Will check A1c for prediabetes and her electrolytes since she is taking potassium tablets and she has history of low potassium.  She had no complaints or concerns.      Prediabetes  Lifestyle change    Breast cyst 07/2024  Abnormal breast mammogram showing cysts according to jennifer, She sees [facility] in [facility].  We requested records from mammogram.  FU in [facility].    Hx of hysterectomy 2/2 fibroids and heavy bleeding and cramps (Hysterectomy 8/13/2018)  Operative Procedure: Total laparoscopic hysterectomy with bilateral salpingectomy and colpopexy, abdominal approach, cystoscopy    Mild intermittent asthma  albuterol sulfate inhaler, as needed.    Essential hypertension  losartan (COZAAR) 100 MG tablet, daily.  amLODIPine (NORVASC) 10 MG tablet, daily.    Hyperlipidemia/TIA  atorvastatin (LIPITOR) 40 MG tablet, daily.    Allergic rhinitis  cetirizine (ZYRTEC) 10 MG tablet, daily as needed.    TIA 2023  Neurological exam unremarkable.  No gross or evident neurological deficit.  aspirin 81 MG EC tablet, daily. OTC.  atorvastatin (LIPITOR) 40 MG tablet, daily.    Occasional vertigo  Once a month usually when bending forward.  Uses meclizine as needed daily, infrequently.  Neurological exam unremarkable.  No gross or evident neurological deficit.    Hx of bilateral carpal tunnel s/p repair 2023    History of low potassium  Denied nausea vomiting or diarrhea.  potassium chloride (KLOR-CON) 10 MEQ extended release tablet, daily.      PAST MEDICAL HISTORY  Medical: as listed.  Surgical: left hand surgery.  Allergies: denied.  Medication: as listed.  Family: denied cancer.  Work: dietary  Social: tobacco denied, [place] denied, recreational drugs

## 2025-01-22 LAB
ANION GAP SERPL CALCULATED.3IONS-SCNC: 12 MMOL/L (ref 3–15)
BUN BLDV-MCNC: 13 MG/DL (ref 6–22)
CALCIUM SERPL-MCNC: 9.6 MG/DL (ref 8.4–10.5)
CHLORIDE BLD-SCNC: 97 MMOL/L (ref 98–110)
CO2: 30 MMOL/L (ref 20–32)
CREAT SERPL-MCNC: 0.6 MG/DL (ref 0.5–1.2)
ESTIMATED AVERAGE GLUCOSE: 122 MG/DL (ref 91–123)
GFR, ESTIMATED: >60 ML/MIN/1.73 SQ.M.
GLUCOSE: 92 MG/DL (ref 70–99)
HBA1C MFR BLD: 5.9 % (ref 4.8–5.6)
POTASSIUM SERPL-SCNC: 4 MMOL/L (ref 3.5–5.5)
SODIUM BLD-SCNC: 139 MMOL/L (ref 133–145)

## 2025-02-06 ENCOUNTER — OFFICE VISIT (OUTPATIENT)
Age: 50
End: 2025-02-06
Payer: COMMERCIAL

## 2025-02-06 VITALS
HEART RATE: 72 BPM | BODY MASS INDEX: 44.65 KG/M2 | SYSTOLIC BLOOD PRESSURE: 135 MMHG | WEIGHT: 252 LBS | HEIGHT: 63 IN | OXYGEN SATURATION: 96 % | DIASTOLIC BLOOD PRESSURE: 85 MMHG

## 2025-02-06 DIAGNOSIS — I10 ESSENTIAL HYPERTENSION WITH GOAL BLOOD PRESSURE LESS THAN 140/90: Primary | ICD-10-CM

## 2025-02-06 DIAGNOSIS — E78.00 PURE HYPERCHOLESTEROLEMIA: ICD-10-CM

## 2025-02-06 PROCEDURE — 3079F DIAST BP 80-89 MM HG: CPT | Performed by: INTERNAL MEDICINE

## 2025-02-06 PROCEDURE — 99214 OFFICE O/P EST MOD 30 MIN: CPT | Performed by: INTERNAL MEDICINE

## 2025-02-06 PROCEDURE — 3075F SYST BP GE 130 - 139MM HG: CPT | Performed by: INTERNAL MEDICINE

## 2025-02-06 ASSESSMENT — PATIENT HEALTH QUESTIONNAIRE - PHQ9
1. LITTLE INTEREST OR PLEASURE IN DOING THINGS: NOT AT ALL
SUM OF ALL RESPONSES TO PHQ9 QUESTIONS 1 & 2: 0
SUM OF ALL RESPONSES TO PHQ QUESTIONS 1-9: 0
2. FEELING DOWN, DEPRESSED OR HOPELESS: NOT AT ALL
SUM OF ALL RESPONSES TO PHQ QUESTIONS 1-9: 0

## 2025-02-06 NOTE — PROGRESS NOTES
Identified pt with two pt identifiers(name and ). Reviewed record in preparation for visit and have obtained necessary documentation.    Thelma Vides presents today for   Chief Complaint   Patient presents with    Follow-up      overdue f/u- pt canceled 10/24 appt with [facility]       Pt c/o SOB, CHEST PAIN/ PRESSURE,              Thelma Vides preferred language for health care discussion is english/other.    Personal Protective Equipment:   Personal Protective Equipment was used including: mask-surgical and hands-gloves. Patient was placed on no precaution(s). Patient was not masked.    Precautions:   Patient currently on None  Patient currently roomed with door closed.    Is someone accompanying this pt? no    Is the patient using any DME equipment during OV? no    Depression Screenin/6/2025     2:00 PM 2025     3:09 PM 2024    11:34 AM 2024    11:24 AM 2022    10:36 AM   PHQ-9 Questionaire   Little interest or pleasure in doing things 0 2 0 0 0   Feeling down, depressed, or hopeless 0 0 0 0 0   PHQ-9 Total Score 0 2 0 0 0        Learning Assessment:  No question data found.    Abuse Screenin/6/2025     2:00 PM   AMB Abuse Screening   Do you ever feel afraid of your partner? N   Are you in a relationship with someone who physically or mentally threatens you? N   Is it safe for you to go home? Y          Fall Risk      2025     2:00 PM   Fall Risk   Do you feel unsteady or are you worried about falling?  no   2 or more falls in past year? no   Fall with injury in past year? no         Pt currently taking Anticoagulant /Antiplatelet therapy? aspirin    Coordination of Care:  1. Have you been to the ER, urgent care clinic since your last visit? Hospitalized since your last visit? no    2. Have you seen or consulted any other health care providers outside of the [facility] since your last visit? Include any pap smears or colon screening. no      Please see

## 2025-02-06 NOTE — PROGRESS NOTES
[facility]    Thelma Vides is 49 y.o. female     Patient is here today for follow-up appointment  Denies any hospital admission or ER visits since last time.  Denies any chest pain or chest tightness.  Mild dyspnea with moderate to severe exertion, unchanged.  Does not perform regular exercise.  No orthopnea or PND.  No edema    Past Medical History:   Diagnosis Date    HLD (hyperlipidemia)     HTN (hypertension)     Obesity     TIA (transient ischemic attack)        Review of Systems:  Cardiac symptoms as noted above in HPI. All others negative.    Current Outpatient Medications   Medication Sig    amLODIPine (NORVASC) 10 MG tablet Take 1 tablet by mouth daily    atorvastatin (LIPITOR) 40 MG tablet Take 1 tablet by mouth daily    losartan (COZAAR) 100 MG tablet Take 1 tablet by mouth daily    potassium chloride (KLOR-CON) 10 MEQ extended release tablet Take 1 tablet by mouth daily Take a tablet by mouth daily    aspirin 81 MG chewable tablet Take 2 tablets by mouth daily    diclofenac (VOLTAREN) 75 MG EC tablet Take 1 tablet by mouth 2 times daily    gabapentin (NEURONTIN) 300 MG capsule Take 1 capsule by mouth nightly as needed (hot flashes) for up to 180 days. Max Daily Amount: 300 mg    albuterol sulfate HFA (VENTOLIN HFA) 108 (90 Base) MCG/ACT inhaler Inhale 2 puffs into the lungs every 6 hours as needed for Wheezing or Shortness of Breath ALTERNATIVES AND GENERICS ALLOWED.     No current facility-administered medications for this visit.       History reviewed. No pertinent surgical history.    Allergies and Sensitivities:  Allergies   Allergen Reactions    Beta Adrenergic Blockers Shortness Of Breath    Iodinated Contrast Media Swelling    Losartan Cough     Active outpatient Rx for losartan since 12/12/22       Family History:  Family History   Problem Relation Age of Onset    No Known Problems Mother     No Known Problems Father     No

## 2025-04-14 ENCOUNTER — PATIENT MESSAGE (OUTPATIENT)
Facility: CLINIC | Age: 50
End: 2025-04-14

## 2025-05-14 DIAGNOSIS — M25.562 CHRONIC PAIN OF BOTH KNEES: ICD-10-CM

## 2025-05-14 DIAGNOSIS — G89.29 CHRONIC PAIN OF BOTH KNEES: ICD-10-CM

## 2025-05-14 DIAGNOSIS — M25.561 CHRONIC PAIN OF BOTH KNEES: ICD-10-CM

## 2025-05-14 NOTE — TELEPHONE ENCOUNTER
Last Appointment:  1/21/2025  Future Appointments   Date Time Provider Department Center   2/24/2026  2:00 PM Kate Loaj PA-C [facility] AMB

## 2025-05-18 RX ORDER — DICLOFENAC SODIUM 75 MG/1
75 TABLET, DELAYED RELEASE ORAL 2 TIMES DAILY PRN
Qty: 60 TABLET | Refills: 0 | Status: SHIPPED | OUTPATIENT
Start: 2025-05-18

## 2025-08-12 ENCOUNTER — OFFICE VISIT (OUTPATIENT)
Facility: CLINIC | Age: 50
End: 2025-08-12
Payer: MEDICAID

## 2025-08-12 VITALS
SYSTOLIC BLOOD PRESSURE: 122 MMHG | OXYGEN SATURATION: 98 % | TEMPERATURE: 97 F | WEIGHT: 249.2 LBS | HEART RATE: 66 BPM | DIASTOLIC BLOOD PRESSURE: 70 MMHG | HEIGHT: 63 IN | BODY MASS INDEX: 44.16 KG/M2 | RESPIRATION RATE: 17 BRPM

## 2025-08-12 DIAGNOSIS — I10 ESSENTIAL HYPERTENSION: ICD-10-CM

## 2025-08-12 DIAGNOSIS — M25.562 CHRONIC PAIN OF BOTH KNEES: ICD-10-CM

## 2025-08-12 DIAGNOSIS — J45.20 MILD INTERMITTENT ASTHMA WITHOUT COMPLICATION: ICD-10-CM

## 2025-08-12 DIAGNOSIS — M25.561 CHRONIC PAIN OF BOTH KNEES: ICD-10-CM

## 2025-08-12 DIAGNOSIS — Z90.710 HX OF HYSTERECTOMY: ICD-10-CM

## 2025-08-12 DIAGNOSIS — R23.2 HOT FLASHES: Primary | ICD-10-CM

## 2025-08-12 DIAGNOSIS — G89.29 CHRONIC PAIN OF BOTH KNEES: ICD-10-CM

## 2025-08-12 DIAGNOSIS — E78.5 HYPERLIPIDEMIA, UNSPECIFIED HYPERLIPIDEMIA TYPE: ICD-10-CM

## 2025-08-12 DIAGNOSIS — E87.6 HYPOKALEMIA: ICD-10-CM

## 2025-08-12 PROCEDURE — 99214 OFFICE O/P EST MOD 30 MIN: CPT | Performed by: INTERNAL MEDICINE

## 2025-08-12 PROCEDURE — 3078F DIAST BP <80 MM HG: CPT | Performed by: INTERNAL MEDICINE

## 2025-08-12 PROCEDURE — 3074F SYST BP LT 130 MM HG: CPT | Performed by: INTERNAL MEDICINE

## 2025-08-12 RX ORDER — GABAPENTIN 300 MG/1
300 CAPSULE ORAL
Qty: 90 CAPSULE | Refills: 1 | Status: SHIPPED | OUTPATIENT
Start: 2025-08-12 | End: 2026-02-08

## 2025-08-12 RX ORDER — ESTRADIOL 0.03 MG/D
1 PATCH TRANSDERMAL
Qty: 4 PATCH | Refills: 2 | Status: SHIPPED | OUTPATIENT
Start: 2025-08-12

## 2025-08-12 RX ORDER — LOSARTAN POTASSIUM 100 MG/1
100 TABLET ORAL DAILY
Qty: 90 TABLET | Refills: 1 | Status: SHIPPED | OUTPATIENT
Start: 2025-08-12

## 2025-08-12 RX ORDER — ALBUTEROL SULFATE 90 UG/1
2 INHALANT RESPIRATORY (INHALATION) EVERY 6 HOURS PRN
Qty: 18 G | Refills: 1 | Status: SHIPPED | OUTPATIENT
Start: 2025-08-12

## 2025-08-12 RX ORDER — AMLODIPINE BESYLATE 10 MG/1
10 TABLET ORAL DAILY
Qty: 90 TABLET | Refills: 1 | Status: SHIPPED | OUTPATIENT
Start: 2025-08-12

## 2025-08-12 RX ORDER — DICLOFENAC SODIUM 75 MG/1
75 TABLET, DELAYED RELEASE ORAL 2 TIMES DAILY PRN
Qty: 60 TABLET | Refills: 0 | Status: SHIPPED | OUTPATIENT
Start: 2025-08-12

## 2025-08-12 RX ORDER — POTASSIUM CHLORIDE 750 MG/1
10 TABLET, EXTENDED RELEASE ORAL DAILY
Qty: 90 TABLET | Refills: 1 | Status: SHIPPED | OUTPATIENT
Start: 2025-08-12

## 2025-08-12 RX ORDER — ATORVASTATIN CALCIUM 40 MG/1
40 TABLET, FILM COATED ORAL DAILY
Qty: 90 TABLET | Refills: 1 | Status: SHIPPED | OUTPATIENT
Start: 2025-08-12

## 2025-08-12 SDOH — ECONOMIC STABILITY: FOOD INSECURITY: WITHIN THE PAST 12 MONTHS, YOU WORRIED THAT YOUR FOOD WOULD RUN OUT BEFORE YOU GOT MONEY TO BUY MORE.: NEVER TRUE

## 2025-08-12 SDOH — ECONOMIC STABILITY: FOOD INSECURITY: WITHIN THE PAST 12 MONTHS, THE FOOD YOU BOUGHT JUST DIDN'T LAST AND YOU DIDN'T HAVE MONEY TO GET MORE.: NEVER TRUE

## 2025-08-12 ASSESSMENT — ENCOUNTER SYMPTOMS
SHORTNESS OF BREATH: 0
ABDOMINAL PAIN: 0